# Patient Record
Sex: FEMALE | Race: BLACK OR AFRICAN AMERICAN | NOT HISPANIC OR LATINO | Employment: FULL TIME | URBAN - METROPOLITAN AREA
[De-identification: names, ages, dates, MRNs, and addresses within clinical notes are randomized per-mention and may not be internally consistent; named-entity substitution may affect disease eponyms.]

---

## 2017-12-13 PROCEDURE — 99283 EMERGENCY DEPT VISIT LOW MDM: CPT

## 2017-12-14 ENCOUNTER — HOSPITAL ENCOUNTER (EMERGENCY)
Facility: HOSPITAL | Age: 27
Discharge: HOME | End: 2017-12-14

## 2017-12-14 VITALS
HEART RATE: 90 BPM | BODY MASS INDEX: 61.68 KG/M2 | WEIGHT: 293 LBS | SYSTOLIC BLOOD PRESSURE: 183 MMHG | OXYGEN SATURATION: 99 % | RESPIRATION RATE: 18 BRPM | TEMPERATURE: 98 F | DIASTOLIC BLOOD PRESSURE: 104 MMHG

## 2017-12-14 DIAGNOSIS — Z23 NEED FOR TDAP VACCINATION: ICD-10-CM

## 2017-12-14 PROCEDURE — 90471 IMMUNIZATION ADMIN: CPT | Performed by: PHYSICIAN ASSISTANT

## 2017-12-14 PROCEDURE — 90715 TDAP VACCINE 7 YRS/> IM: CPT | Performed by: PHYSICIAN ASSISTANT

## 2017-12-14 PROCEDURE — 63600175 PHARM REV CODE 636 W HCPCS: Performed by: PHYSICIAN ASSISTANT

## 2017-12-14 RX ADMIN — CLOSTRIDIUM TETANI TOXOID ANTIGEN (FORMALDEHYDE INACTIVATED), CORYNEBACTERIUM DIPHTHERIAE TOXOID ANTIGEN (FORMALDEHYDE INACTIVATED), BORDETELLA PERTUSSIS TOXOID ANTIGEN (GLUTARALDEHYDE INACTIVATED), BORDETELLA PERTUSSIS FILAMENTOUS HEMAGGLUTININ ANTIGEN (FORMALDEHYDE INACTIVATED), BORDETELLA PERTUSSIS PERTACTIN ANTIGEN, AND BORDETELLA PERTUSSIS FIMBRIAE 2/3 ANTIGEN 0.5 ML: 5; 2; 2.5; 5; 3; 5 INJECTION, SUSPENSION INTRAMUSCULAR at 01:12
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
## 2024-11-13 ENCOUNTER — HOSPITAL ENCOUNTER (OUTPATIENT)
Dept: RADIOLOGY | Facility: HOSPITAL | Age: 34
Discharge: HOME | End: 2024-11-13
Attending: ORTHOPAEDIC SURGERY
Payer: COMMERCIAL

## 2024-11-13 ENCOUNTER — OFFICE VISIT (OUTPATIENT)
Dept: ORTHOPEDICS | Facility: CLINIC | Age: 34
End: 2024-11-13
Payer: COMMERCIAL

## 2024-11-13 DIAGNOSIS — G89.29 CHRONIC PAIN OF RIGHT THUMB: ICD-10-CM

## 2024-11-13 DIAGNOSIS — G89.29 CHRONIC PAIN OF RIGHT THUMB: Primary | ICD-10-CM

## 2024-11-13 DIAGNOSIS — M79.644 CHRONIC PAIN OF RIGHT THUMB: Primary | ICD-10-CM

## 2024-11-13 DIAGNOSIS — M79.644 CHRONIC PAIN OF RIGHT THUMB: ICD-10-CM

## 2024-11-13 DIAGNOSIS — S63.114A CLOSED DISLOCATION OF METACARPOPHALANGEAL JOINT OF RIGHT THUMB, INITIAL ENCOUNTER: Primary | ICD-10-CM

## 2024-11-13 PROCEDURE — 73140 X-RAY EXAM OF FINGER(S): CPT | Mod: 26,RT,, | Performed by: RADIOLOGY

## 2024-11-13 PROCEDURE — 99999 PR PBB SHADOW E&M-EST. PATIENT-LVL II: CPT | Mod: PBBFAC,,, | Performed by: ORTHOPAEDIC SURGERY

## 2024-11-13 PROCEDURE — 73140 X-RAY EXAM OF FINGER(S): CPT | Mod: TC,RT

## 2024-11-13 RX ORDER — DOXYCYCLINE HYCLATE 100 MG/1
TABLET, DELAYED RELEASE ORAL
COMMUNITY
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
## 2024-11-13 NOTE — ASSESSMENT & PLAN NOTE
The patient and I talked at length about the natural history and pathophysiology of her injury which is right thumb metacarpal phalangeal joint dislocation , she understands that this may lead to chronic problems which may have acute episodic exacerbations.   Symptoms may resolve, worsen and even become permanent.  The patient understands the treatment options including observation, activity modification, therapy, NSAIDs, splints and the surgical options including repair versus reconstruction of the right thumb ulnar collateral ligament.  We discussed the risks of the diagnosis and the treatment options including pain, infection, bleeding, damage to nerves and vessels, stiffness, scarring, incomplete relief or recurrence of symptoms, poor pain and functional outcomes.  Unique risks of this diagnosis and the treatment include persistent instability and arthritis.   The patient has elected to proceed with right thumb spica splinting, right thumb MRI to assess the integrity of the ulnar collateral ligament and we will follow up after the MRI.  She understands no pinching and gripping with her right-hand

## 2024-12-04 ENCOUNTER — OFFICE VISIT (OUTPATIENT)
Dept: ORTHOPEDICS | Facility: CLINIC | Age: 34
End: 2024-12-04
Payer: COMMERCIAL

## 2024-12-04 ENCOUNTER — HOSPITAL ENCOUNTER (OUTPATIENT)
Dept: RADIOLOGY | Facility: HOSPITAL | Age: 34
Discharge: HOME | End: 2024-12-04
Attending: ORTHOPAEDIC SURGERY
Payer: COMMERCIAL

## 2024-12-04 DIAGNOSIS — M79.644 CHRONIC PAIN OF RIGHT THUMB: ICD-10-CM

## 2024-12-04 DIAGNOSIS — G89.29 CHRONIC PAIN OF RIGHT THUMB: ICD-10-CM

## 2024-12-04 DIAGNOSIS — S63.114A CLOSED DISLOCATION OF METACARPOPHALANGEAL JOINT OF RIGHT THUMB, INITIAL ENCOUNTER: Primary | ICD-10-CM

## 2024-12-04 PROCEDURE — 99214 OFFICE O/P EST MOD 30 MIN: CPT | Mod: S$GLB,,, | Performed by: ORTHOPAEDIC SURGERY

## 2024-12-04 PROCEDURE — 1159F MED LIST DOCD IN RCRD: CPT | Mod: CPTII,S$GLB,, | Performed by: ORTHOPAEDIC SURGERY

## 2024-12-04 PROCEDURE — 73140 X-RAY EXAM OF FINGER(S): CPT | Mod: TC,RT

## 2024-12-04 PROCEDURE — 73140 X-RAY EXAM OF FINGER(S): CPT | Mod: 26,RT,, | Performed by: RADIOLOGY

## 2024-12-04 PROCEDURE — 99999 PR PBB SHADOW E&M-EST. PATIENT-LVL III: CPT | Mod: PBBFAC,,, | Performed by: ORTHOPAEDIC SURGERY

## 2024-12-04 RX ORDER — MELOXICAM 15 MG/1
15 TABLET ORAL DAILY
COMMUNITY
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
## 2024-12-04 NOTE — ASSESSMENT & PLAN NOTE
The patient and I talked at length about the natural history and pathophysiology of her injury which is right thumb metacarpal phalangeal joint dislocation , she understands that this may lead to chronic problems which may have acute episodic exacerbations.   Symptoms may resolve, worsen and even become permanent.  The patient understands the treatment options including observation, activity modification, therapy, NSAIDs, splints and the surgical options including repair versus reconstruction of the right thumb ulnar collateral ligament.  We discussed the risks of the diagnosis and the treatment options including pain, infection, bleeding, damage to nerves and vessels, stiffness, scarring, incomplete relief or recurrence of symptoms, poor pain and functional outcomes.  Unique risks of this diagnosis and the treatment include persistent instability and arthritis.   The patient has elected to proceed with right thumb spica splinting for another 2 weeks.  She will discontinue the splint that time we will get her into therapy and I will see her back in 4 weeks.  She understands no pinching and gripping

## 2024-12-06 ENCOUNTER — PATIENT MESSAGE (OUTPATIENT)
Dept: ORTHOPEDICS | Facility: CLINIC | Age: 34
End: 2024-12-06
Payer: COMMERCIAL

## 2024-12-09 ENCOUNTER — CLINICAL SUPPORT (OUTPATIENT)
Dept: REHABILITATION | Facility: HOSPITAL | Age: 34
End: 2024-12-09
Attending: ORTHOPAEDIC SURGERY
Payer: COMMERCIAL

## 2024-12-09 ENCOUNTER — TELEPHONE (OUTPATIENT)
Dept: ORTHOPEDICS | Facility: CLINIC | Age: 34
End: 2024-12-09
Payer: COMMERCIAL

## 2024-12-09 DIAGNOSIS — R29.898 DECREASED GRIP STRENGTH OF RIGHT HAND: ICD-10-CM

## 2024-12-09 DIAGNOSIS — Z78.9 DECREASED ACTIVITIES OF DAILY LIVING (ADL): ICD-10-CM

## 2024-12-09 DIAGNOSIS — M25.541 PAIN IN THUMB JOINT WITH MOVEMENT OF RIGHT HAND: ICD-10-CM

## 2024-12-09 DIAGNOSIS — R29.898 DECREASED PINCH STRENGTH: ICD-10-CM

## 2024-12-09 DIAGNOSIS — M25.649 STIFFNESS OF THUMB JOINT: Primary | ICD-10-CM

## 2024-12-09 DIAGNOSIS — S63.114A CLOSED DISLOCATION OF METACARPOPHALANGEAL JOINT OF RIGHT THUMB, INITIAL ENCOUNTER: ICD-10-CM

## 2024-12-09 PROCEDURE — 97165 OT EVAL LOW COMPLEX 30 MIN: CPT

## 2024-12-09 PROCEDURE — 97110 THERAPEUTIC EXERCISES: CPT

## 2024-12-09 NOTE — TELEPHONE ENCOUNTER
Spoke to patient about paperwork and going back to work. I informed her that Dr. Anders is out of the office today, but that I would check with him to make sure he is okay with her going back to work 3 days a week with going only half a day each time. Patient understood and was grateful for the call.     ----- Message from [employer] sent at 12/9/2024  8:02 AM CST -----  Contact: Frankie  Type:  Needs Medical Advice    Who Called: Frankie  Symptoms (please be specific): /   How long has patient had these symptoms:  /  Pharmacy name and phone #:  /  Would the patient rather a call back or a response via [facility]? call  Best Call Back Number: [phone]   Additional Information: needing a paperwork filled out for employer

## 2024-12-10 ENCOUNTER — PATIENT MESSAGE (OUTPATIENT)
Dept: ORTHOPEDICS | Facility: CLINIC | Age: 34
End: 2024-12-10
Payer: COMMERCIAL

## 2024-12-10 PROBLEM — R29.898 DECREASED GRIP STRENGTH OF RIGHT HAND: Status: ACTIVE | Noted: 2024-12-10

## 2024-12-10 PROBLEM — R29.898 DECREASED PINCH STRENGTH: Status: ACTIVE | Noted: 2024-12-10

## 2024-12-10 PROBLEM — M25.541 PAIN IN THUMB JOINT WITH MOVEMENT OF RIGHT HAND: Status: ACTIVE | Noted: 2024-12-10

## 2024-12-10 PROBLEM — Z78.9 DECREASED ACTIVITIES OF DAILY LIVING (ADL): Status: ACTIVE | Noted: 2024-12-10
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
## 2024-12-11 ENCOUNTER — CLINICAL SUPPORT (OUTPATIENT)
Dept: REHABILITATION | Facility: HOSPITAL | Age: 34
End: 2024-12-11
Payer: COMMERCIAL

## 2024-12-11 DIAGNOSIS — Z78.9 DECREASED ACTIVITIES OF DAILY LIVING (ADL): ICD-10-CM

## 2024-12-11 DIAGNOSIS — M25.541 PAIN IN THUMB JOINT WITH MOVEMENT OF RIGHT HAND: ICD-10-CM

## 2024-12-11 DIAGNOSIS — M25.649 STIFFNESS OF THUMB JOINT: Primary | ICD-10-CM

## 2024-12-11 DIAGNOSIS — R29.898 DECREASED PINCH STRENGTH: ICD-10-CM

## 2024-12-11 DIAGNOSIS — R29.898 DECREASED GRIP STRENGTH OF RIGHT HAND: ICD-10-CM

## 2024-12-11 PROCEDURE — 97140 MANUAL THERAPY 1/> REGIONS: CPT

## 2024-12-11 PROCEDURE — 97018 PARAFFIN BATH THERAPY: CPT

## 2024-12-11 PROCEDURE — 97112 NEUROMUSCULAR REEDUCATION: CPT

## 2024-12-11 PROCEDURE — 97110 THERAPEUTIC EXERCISES: CPT

## 2024-12-11 NOTE — PROGRESS NOTES
[facility]  Occupational Therapy Treatment Note     Date: 12/11/2024  Name: Frankie Plaza  Clinic Number: [id number]    Therapy Diagnosis:   Encounter Diagnoses   Name Primary?    Stiffness of thumb joint Yes    Pain in thumb joint with movement of right hand     Decreased activities of daily living (ADL)     Decreased  strength of right hand     Decreased pinch strength      Physician: Buck Anders MD    Physician Orders: OT eval and tx  Medical Diagnosis: Closed dislocation of metacarpophalangeal joint of right thumb, initial encounter [S63.114A]   Surgical procedure and date:  N/A  MD Order Comments: OT Eval/treat     Evaluation Date: 12/9/2024  Insurance Authorization Period Expiration: 12/4/2024 to 12/31/2024  Plan of Care Certification Period: 12/9/2024 to 2/3/2025  Progress Note Due: 1/9/2025      Visit # / Visits authorized: 1/1  FOTO: 1/3  Scores:  initial = 38% / goal = 63%                                           Date of Return to MD: 1/2/2025     Precautions:  Standard; no pinching or gripping per MD    Time In: 11:00  Time Out: 12:00  Total Billable Time: 60 minutes    Subjective     Pt reports: my thumb still hurts, but feels like it is easier to reach my thumb to my pinky.  I have been doing more repetitions than you said because it want it back sooner.  I was achy when I went to bed last night.  I took Flexaril before I came in today because I don't want the soreness like the first day.  I saw the MRI results in my chart and I could see the tear.     she was compliant with home exercise program given on evaluation.  Response to previous treatment: soreness in my thumb with starting to move it more  Functional change: still feels stiff in the larger joint of my thumb, but keep trying to bend it    Pain: 7/10 (5/10 on evaluation)  Location: thumb MP joint and thenar    Objective   Objective measures updated at progress report unless  specified.    Thumb MP pre exercise:  /32, IP /7; post exercise /20, /30  Treatment     Frankie received the treatments listed below:      therapeutic exercises to develop strength, endurance, ROM, and flexibility of right hand for 20 minutes including:  - reviewed HEP issued at evaluation  - thumb AROM with MP flex, composite ext/flex, opposition, palmar abduction - x10 reps ea  - Dexteciser - 3 min    manual therapy techniques were applied to right hand for 15 minutes including:  - use of hand techniques, cupping, IASTM tools to increase blood flow/circulation, improve soft tissue pliability and decrease pain  -gentle passive mobilization to right thumb MP joint to improve joint flexibility    neuromuscular re-education activities to improve Coordination and Proprioception of right hand for 15 minutes including:  - 9 hole peg with thumb to index, middle, ring manipulation  - small object prehension (coin p/u w/ IF,LF and marble w/ RF,SF)    therapeutic activities to improve functional performance of right hand for 0 minutes including:  -     self-care techniques to improve independence and safety with ADL/IADL tasks for 0 minutes including:  -     direct contact modalities after being cleared for contraindications for 0 minutes including:  -     supervised modalities after being cleared for contradictions for 10 minutes including:  - paraffin to right hand with MHP pre-tx to decrease pain and increase joint mobility and tissue extensibility    Home Exercises and Education Provided     Education provided:   - reviewed HEP issued at evaluation  - progress toward goals  - instructed in personal purchase of Isotoner compression glove on [employer] with resource for item    Written Home Exercises Provided: Patient instructed to cont prior HEP  Exercises were reviewed and Frankie was able to demonstrate them prior to the end of the session. Frankie demonstrated good understanding of the HEP provided.     See EMR under  Patient Instructions for exercises provided during prior visit.        Assessment     Frankie was seen for her first tx session since initial evaluation on 12/9/2024.  Patient initiated treatment program including exercises/activities to facilitate thumb mobility and functional reach.  Demonstrates slight increase in MP joint motion as compared to initial visit with decrease in guarding.  Able to participate in treatment session without increased complaints of pain.    Frankie is progressing towards her goals and there are no updates to goals at this time. Pt prognosis is Good.     Frankie will continue to benefit from skilled outpatient occupational therapy services to address the deficits listed in the problem list on initial evaluation, to provide pt/family education, and to maximize pt's level of independence in the home and community environment.     Pt's spiritual, cultural and educational needs considered and pt agreeable to plan of care and goals.    Anticipated barriers to occupational therapy: co-morbidity conditions, guarded behavior, pain     Goals:  Short Term Goals: (4 weeks)  1. Pt will be independent with HEP.  2. Pt will report decreased pain to a 4/10 with ADL/IADL tasks.   3. Pt will increase right thumb AROM by 10 degrees to enable dressing, grooming activities.  4. Pt will increase right wrist flex/ext AROM by 10 degrees to enable dressing, grooming activities.  5. Pt will make a full, flat, composite fist to enable grasping and squeezing objects for self-care.  6. Pt will report an increase in FOTO intake score of > 48%, which would indicate an improvement in quality of life.     Long Term Goals: (8 weeks)  1. Pt will report decreased pain to 1-2/10 with ADL/IADL tasks.   2. Pt will exhibit increased right thumb AROM by 20 degrees to enable independence with self-care, meal preparation and work activities.  3. Pt will exhibit increased wrist flexion/extension AROM by 20 degrees to enable  independence with self-care, meal preparation and work activities.  4. Pt will exhibit 50%  strength of right hand as compared to left hand to allow a firm grasp on cooking utensils, steering wheel, etc.  5. Pt will exhibit 9# of right functional lateral pinch strength to allow writing, opening containers, and turning keys.  6. Pt will report an increase in FOTO intake score of > 60%, which would indicate an improvement in quality of life.    Plan     Plan of Care Certification: 12/9/2024 to 2/3/2025.      Outpatient Occupational Therapy 2-3 times weekly for 8 weeks to include the following interventions PRN: Fluidotherapy, Manual Therapy, Moist Heat/ Ice, Neuromuscular Re-ed, Orthotic Management and Training, Paraffin, Patient Education, Self Care, Therapeutic Activities, Therapeutic Exercise, and Ultrasound    Updates/Grading for next session: progress occupational therapy as tolerated    Sophie Bermudez, OT

## 2024-12-16 ENCOUNTER — TELEPHONE (OUTPATIENT)
Dept: ORTHOPEDICS | Facility: CLINIC | Age: 34
End: 2024-12-16
Payer: COMMERCIAL

## 2024-12-16 NOTE — TELEPHONE ENCOUNTER
Spoke to Luba about the patient's restrictions and clarified any confusion. She understood and was grateful for the call.     ----- Message from N[place] sent at 12/16/2024  1:02 PM CST -----  Contact: Luba/ [facility]  Luba is needing a call back in regards to the patients [facility] paperwork for 12/04. Please give her a call back at [phone]

## 2024-12-17 ENCOUNTER — CLINICAL SUPPORT (OUTPATIENT)
Dept: REHABILITATION | Facility: HOSPITAL | Age: 34
End: 2024-12-17
Payer: COMMERCIAL

## 2024-12-17 DIAGNOSIS — Z78.9 DECREASED ACTIVITIES OF DAILY LIVING (ADL): ICD-10-CM

## 2024-12-17 DIAGNOSIS — M25.541 PAIN IN THUMB JOINT WITH MOVEMENT OF RIGHT HAND: ICD-10-CM

## 2024-12-17 DIAGNOSIS — R29.898 DECREASED GRIP STRENGTH OF RIGHT HAND: ICD-10-CM

## 2024-12-17 DIAGNOSIS — M25.649 STIFFNESS OF THUMB JOINT: Primary | ICD-10-CM

## 2024-12-17 DIAGNOSIS — R29.898 DECREASED PINCH STRENGTH: ICD-10-CM

## 2024-12-17 PROCEDURE — 97112 NEUROMUSCULAR REEDUCATION: CPT

## 2024-12-17 PROCEDURE — 97140 MANUAL THERAPY 1/> REGIONS: CPT

## 2024-12-17 PROCEDURE — 97530 THERAPEUTIC ACTIVITIES: CPT

## 2024-12-17 PROCEDURE — 97018 PARAFFIN BATH THERAPY: CPT

## 2024-12-17 PROCEDURE — 97110 THERAPEUTIC EXERCISES: CPT

## 2024-12-17 NOTE — PROGRESS NOTES
"  [facility]  Occupational Therapy Treatment Note     Date: 12/17/2024  Name: Frankie Plaza  Clinic Number: [id number]    Therapy Diagnosis:   Encounter Diagnoses   Name Primary?    Stiffness of thumb joint Yes    Pain in thumb joint with movement of right hand     Decreased activities of daily living (ADL)     Decreased  strength of right hand     Decreased pinch strength      Physician: Buck Anders MD    Physician Orders: OT eval and tx  Medical Diagnosis: Closed dislocation of metacarpophalangeal joint of right thumb, initial encounter [S63.114A]   Surgical procedure and date:  N/A  MD Order Comments: OT Eval/treat     Evaluation Date: 12/9/2024  Insurance Authorization Period Expiration: 12/4/2024 to 12/31/2024  Plan of Care Certification Period: 12/9/2024 to 2/3/2025  Progress Note Due: 1/9/2025      Visit # / Visits authorized: 2/8  FOTO: 1/3  Scores:  initial = 38% / goal = 63%                                           Date of Return to MD: 1/2/2025     Precautions:  Standard; no pinching or gripping per MD    Time In: 8:55  Time Out: 10:00  Total Billable Time: 60 minutes    Subjective     Pt reports: I stopped wearing the thumb brace yesterday evening and today will be the first day without.  It was painful and feels tight and stiff with and without the brace. My job still has not cleared me back to work yet.  I didn't take the Flexaril today before I came in and try to take it only if I get to that "6.".      She was compliant with home exercise program given on evaluation.  Response to previous treatment: did well after last treatment and not hurting; more flexible at the top joint, but not the back joint;   Functional change: practiced writing yesterday and able to hold the pen, but hurt a lot so modified how I hold it; easier to reach my thumb to touch my finger tips    Pain: 6.5/10 (5/10 on evaluation)  Location: thumb MP joint and " rebecca    Objective   Objective measures updated at progress report unless specified.    Thumb MP pre exercise:  /28, IP /37, ARCINIEGA=65; post exercise /40, /32, ARCINIEGA=72  Treatment     Frankie received the treatments listed below:      direct contact modalities after being cleared for contraindications for 0 minutes including:  -     supervised modalities after being cleared for contradictions for 10 minutes including:  - paraffin to right hand with MHP pre-tx to decrease pain and increase joint mobility and tissue extensibility    therapeutic exercises to develop strength, endurance, ROM, and flexibility of right hand for 8 minutes including:  - thumb AROM with MP flex, composite ext/flex, opposition, palmar abduction - x10 reps ea  - active range of motion measures of thumb pre & post     manual therapy techniques were applied to right hand for 18 minutes including:  - use of hand techniques, cupping, IASTM tools to increase blood flow/circulation, improve soft tissue pliability and decrease pain  -gentle passive mobilization/PROM to right thumb MP joint, composite thumb flexion to improve joint flexibility    neuromuscular re-education activities to improve Coordination and Proprioception of right hand for 15 minutes including:  - Dexteciser - 3 min  - 9 hole peg with thumb to middle, ring, small manipulation  - - Isospheres, CW, 3 min    therapeutic activities to improve functional performance of right hand for 8 minutes including:  - handwriting with Dr. Dudley garcia    self-care techniques to improve independence and safety with ADL/IADL tasks for 0 minutes including:  -     Home Exercises and Education Provided     Education provided:   - reviewed HEP compliance  - progress toward goals  - instructed in personal purchase of Dr. Dudley garcia on [employer] with resource for item    Written Home Exercises Provided: Patient instructed to cont prior HEP  Exercises were reviewed and Frankie was  able to demonstrate them prior to the end of the session. Frankie demonstrated good understanding of the HEP provided.     See EMR under Patient Instructions for exercises provided during prior visit.        Assessment     Frankie was seen in follow up addressing thumb mobility, pain management and functional use of right hand.  Patient discontinued thumb spica bracing yesterday per instruction of MD for 2 week wear time.  Demonstrates increase in ARCINIEGA of thumb flexion by 7 degrees pre to post manual.  Slight guarding and apprehension related to pushing into MP flexion.  Demonstrates improved tolerance to handwriting using therapist's Dr. Buckner writing instrument.  Able to participate in treatment session without increased complaints of pain.    Frankie is progressing towards her goals and there are no updates to goals at this time. Pt prognosis is Good.     Frankie will continue to benefit from skilled outpatient occupational therapy services to address the deficits listed in the problem list on initial evaluation, to provide pt/family education, and to maximize pt's level of independence in the home and community environment.     Pt's spiritual, cultural and educational needs considered and pt agreeable to plan of care and goals.    Anticipated barriers to occupational therapy: co-morbidity conditions, guarded behavior, pain     Goals:  Short Term Goals: (4 weeks)  1. Pt will be independent with HEP.  2. Pt will report decreased pain to a 4/10 with ADL/IADL tasks.   3. Pt will increase right thumb AROM by 10 degrees to enable dressing, grooming activities.  4. Pt will increase right wrist flex/ext AROM by 10 degrees to enable dressing, grooming activities.  5. Pt will make a full, flat, composite fist to enable grasping and squeezing objects for self-care.  6. Pt will report an increase in FOTO intake score of > 48%, which would indicate an improvement in quality of life.     Long Term Goals: (8 weeks)  1.  Pt will report decreased pain to 1-2/10 with ADL/IADL tasks.   2. Pt will exhibit increased right thumb AROM by 20 degrees to enable independence with self-care, meal preparation and work activities.  3. Pt will exhibit increased wrist flexion/extension AROM by 20 degrees to enable independence with self-care, meal preparation and work activities.  4. Pt will exhibit 50%  strength of right hand as compared to left hand to allow a firm grasp on cooking utensils, steering wheel, etc.  5. Pt will exhibit 9# of right functional lateral pinch strength to allow writing, opening containers, and turning keys.  6. Pt will report an increase in FOTO intake score of > 60%, which would indicate an improvement in quality of life.    Plan     Plan of Care Certification: 12/9/2024 to 2/3/2025.      Outpatient Occupational Therapy 2-3 times weekly for 8 weeks to include the following interventions PRN: Fluidotherapy, Manual Therapy, Moist Heat/ Ice, Neuromuscular Re-ed, Orthotic Management and Training, Paraffin, Patient Education, Self Care, Therapeutic Activities, Therapeutic Exercise, and Ultrasound    Updates/Grading for next session: progress occupational therapy as tolerated    Sophie Bermudez, OT

## 2024-12-18 ENCOUNTER — CLINICAL SUPPORT (OUTPATIENT)
Dept: REHABILITATION | Facility: HOSPITAL | Age: 34
End: 2024-12-18
Payer: COMMERCIAL

## 2024-12-18 DIAGNOSIS — M25.649 STIFFNESS OF THUMB JOINT: Primary | ICD-10-CM

## 2024-12-18 DIAGNOSIS — Z78.9 DECREASED ACTIVITIES OF DAILY LIVING (ADL): ICD-10-CM

## 2024-12-18 DIAGNOSIS — R29.898 DECREASED PINCH STRENGTH: ICD-10-CM

## 2024-12-18 DIAGNOSIS — R29.898 DECREASED GRIP STRENGTH OF RIGHT HAND: ICD-10-CM

## 2024-12-18 DIAGNOSIS — M25.541 PAIN IN THUMB JOINT WITH MOVEMENT OF RIGHT HAND: ICD-10-CM

## 2024-12-18 PROCEDURE — 97140 MANUAL THERAPY 1/> REGIONS: CPT

## 2024-12-18 PROCEDURE — 97112 NEUROMUSCULAR REEDUCATION: CPT

## 2024-12-18 PROCEDURE — 97018 PARAFFIN BATH THERAPY: CPT

## 2024-12-18 PROCEDURE — 97110 THERAPEUTIC EXERCISES: CPT

## 2024-12-18 NOTE — PROGRESS NOTES
[facility]  Occupational Therapy Treatment Note     Date: 12/18/2024  Name: Frankie Plaza  Clinic Number: [id number]    Therapy Diagnosis:   Encounter Diagnoses   Name Primary?    Stiffness of thumb joint Yes    Pain in thumb joint with movement of right hand     Decreased activities of daily living (ADL)     Decreased  strength of right hand     Decreased pinch strength      Physician: Buck Anders MD    Physician Orders: OT eval and tx  Medical Diagnosis: Closed dislocation of metacarpophalangeal joint of right thumb, initial encounter [S63.114A]   Surgical procedure and date:  N/A  MD Order Comments: OT Eval/treat     Evaluation Date: 12/9/2024  Insurance Authorization Period Expiration: 12/4/2024 to 12/31/2024  Plan of Care Certification Period: 12/9/2024 to 2/3/2025  Progress Note Due: 1/9/2025      Visit # / Visits authorized: 3/8  FOTO: 1/3  Scores:  initial = 38% / goal = 63%                                           Date of Return to MD: 1/2/2025     Precautions:  Standard; no pinching or gripping per MD    Time In: 10:00  Time Out: 11:00  Total Billable Time: 60 minutes    Subjective     Pt reports: I have been hurting along the side of my thumb more the past few days.  I have been cleared to return to work on Monday.  I took the Flexaril today before I came because of it hurting me more.      She was compliant with home exercise program given on evaluation.  Response to previous treatment: did okay after last treatment; still feeling more flexible at the top joint, but not the back joint  Functional change: not wearing the brace at all and trying to use my hand; ordered the Isotoner glove and the Dr.  that should deliver tomorrow    Pain: 6.5/10 (5/10 on evaluation)  Location: thumb MP joint and thenar    Objective   Objective measures updated at progress report unless specified.    Thumb MP  post exercise /40, /35, ARCINIEGA=75 (previous  72)  Treatment     Frankie received the treatments listed below:      direct contact modalities after being cleared for contraindications for 0 minutes including:  -     supervised modalities after being cleared for contradictions for 10 minutes including:  - paraffin to right hand with MHP pre-tx to decrease pain and increase joint mobility and tissue extensibility    therapeutic exercises to develop strength, endurance, ROM, and flexibility of right hand for 12 minutes including:  - thumb AROM with MP flex, composite ext/flex, opposition, palmar abduction - x10 reps ea  - active range of motion measures of thumb post exercise    manual therapy techniques were applied to right hand for 18 minutes including:  - use of hand techniques, cupping, IASTM tools to increase blood flow/circulation, improve soft tissue pliability and decrease pain  -gentle passive mobilization/PROM to right thumb MP joint, composite thumb flexion to improve joint flexibility    neuromuscular re-education activities to improve Coordination and Proprioception of right hand for 20 minutes including:  - Dexteciser - 3 min  - 9 hole peg with thumb to middle, ring, small manipulation  - in/out hand translation with marbles, 5 in hand x6  - Isospheres, CW, 3 min    therapeutic activities to improve functional performance of right hand for 0 minutes including:  - handwriting with Dr. Dudley garcia    self-care techniques to improve independence and safety with ADL/IADL tasks for 0 minutes including:  -     Home Exercises and Education Provided     Education provided:   - reviewed HEP compliance  - progress toward goals and educated related to tissue healing/recent dc of brace/functional use related to pain complaints  - reviewed personal purchase of Dr. Dudley garcia and Isotoner glove     Written Home Exercises Provided: Patient instructed to cont prior HEP  Exercises were reviewed and Frankie was able to demonstrate them  prior to the end of the session. Frankie demonstrated good understanding of the HEP provided.     See EMR under Patient Instructions for exercises provided during prior visit.        Assessment     Frankie was seen in follow up addressing thumb mobility, pain management and functional use of right hand.  Patient presents with increase in thumb pain; discussed with patient recent changes in recovery with discontinuation of thumb brace and increase in functional use.  Overall with increase in thumb ARCINIEGA as compared to initial status. Able to participate in treatment session without increased complaints of pain.    Frankie is progressing towards her goals and there are no updates to goals at this time. Pt prognosis is Good.     Frankie will continue to benefit from skilled outpatient occupational therapy services to address the deficits listed in the problem list on initial evaluation, to provide pt/family education, and to maximize pt's level of independence in the home and community environment.     Pt's spiritual, cultural and educational needs considered and pt agreeable to plan of care and goals.    Anticipated barriers to occupational therapy: co-morbidity conditions, guarded behavior, pain     Goals:  Short Term Goals: (4 weeks)  1. Pt will be independent with HEP.  2. Pt will report decreased pain to a 4/10 with ADL/IADL tasks.   3. Pt will increase right thumb AROM by 10 degrees to enable dressing, grooming activities.  4. Pt will increase right wrist flex/ext AROM by 10 degrees to enable dressing, grooming activities.  5. Pt will make a full, flat, composite fist to enable grasping and squeezing objects for self-care.  6. Pt will report an increase in FOTO intake score of > 48%, which would indicate an improvement in quality of life.     Long Term Goals: (8 weeks)  1. Pt will report decreased pain to 1-2/10 with ADL/IADL tasks.   2. Pt will exhibit increased right thumb AROM by 20 degrees to enable  independence with self-care, meal preparation and work activities.  3. Pt will exhibit increased wrist flexion/extension AROM by 20 degrees to enable independence with self-care, meal preparation and work activities.  4. Pt will exhibit 50%  strength of right hand as compared to left hand to allow a firm grasp on cooking utensils, steering wheel, etc.  5. Pt will exhibit 9# of right functional lateral pinch strength to allow writing, opening containers, and turning keys.  6. Pt will report an increase in FOTO intake score of > 60%, which would indicate an improvement in quality of life.    Plan     Plan of Care Certification: 12/9/2024 to 2/3/2025.      Outpatient Occupational Therapy 2-3 times weekly for 8 weeks to include the following interventions PRN: Fluidotherapy, Manual Therapy, Moist Heat/ Ice, Neuromuscular Re-ed, Orthotic Management and Training, Paraffin, Patient Education, Self Care, Therapeutic Activities, Therapeutic Exercise, and Ultrasound    Updates/Grading for next session: progress ROM as tolerated    Sophie Bermudez OT

## 2024-12-27 DIAGNOSIS — S63.114A CLOSED DISLOCATION OF METACARPOPHALANGEAL JOINT OF RIGHT THUMB, INITIAL ENCOUNTER: Primary | ICD-10-CM

## 2025-01-02 ENCOUNTER — OFFICE VISIT (OUTPATIENT)
Dept: ORTHOPEDICS | Facility: CLINIC | Age: 35
End: 2025-01-02
Payer: COMMERCIAL

## 2025-01-02 ENCOUNTER — HOSPITAL ENCOUNTER (OUTPATIENT)
Dept: RADIOLOGY | Facility: HOSPITAL | Age: 35
Discharge: HOME | End: 2025-01-02
Attending: ORTHOPAEDIC SURGERY
Payer: COMMERCIAL

## 2025-01-02 DIAGNOSIS — S63.114D: Primary | ICD-10-CM

## 2025-01-02 DIAGNOSIS — S63.114A CLOSED DISLOCATION OF METACARPOPHALANGEAL JOINT OF RIGHT THUMB, INITIAL ENCOUNTER: ICD-10-CM

## 2025-01-02 PROCEDURE — 99999 PR PBB SHADOW E&M-EST. PATIENT-LVL II: CPT | Mod: PBBFAC,,, | Performed by: ORTHOPAEDIC SURGERY

## 2025-01-02 PROCEDURE — 99214 OFFICE O/P EST MOD 30 MIN: CPT | Mod: S$GLB,,, | Performed by: ORTHOPAEDIC SURGERY

## 2025-01-02 PROCEDURE — 73140 X-RAY EXAM OF FINGER(S): CPT | Mod: 26,RT,, | Performed by: RADIOLOGY

## 2025-01-02 PROCEDURE — 1159F MED LIST DOCD IN RCRD: CPT | Mod: CPTII,S$GLB,, | Performed by: ORTHOPAEDIC SURGERY

## 2025-01-02 PROCEDURE — 73140 X-RAY EXAM OF FINGER(S): CPT | Mod: TC,RT

## 2025-01-02 NOTE — ASSESSMENT & PLAN NOTE
The patient and I talked at length about the natural history and pathophysiology of her injury which is right thumb metacarpal phalangeal joint dislocation , she understands that this may lead to chronic problems which may have acute episodic exacerbations.   Symptoms may resolve, worsen and even become permanent.  The patient understands the treatment options including observation, activity modification, therapy, NSAIDs, splints and the surgical options including repair versus reconstruction of the right thumb ulnar collateral ligament.  We discussed the risks of the diagnosis and the treatment options including pain, infection, bleeding, damage to nerves and vessels, stiffness, scarring, incomplete relief or recurrence of symptoms, poor pain and functional outcomes.  Unique risks of this diagnosis and the treatment include persistent instability and arthritis.   We will discontinue the splint she will continue with the therapy for range-of-motion exercises.  She understands no heavy pinch or  and I will see her back in 6 weeks

## 2025-01-02 NOTE — PROGRESS NOTES
JULIÁN Anders M.D.  Orthopaedic Hand and Wrist Surgery  [address]    Patient ID: Frankie Plaza  YOB: 1990  MRN: [id number]    Date of Injury:  November 10, 2024    Diagnosis:   Right thumb MCP dislocation    History of Present Illness: Frankie Plaza is a 34 y.o. female she is now 6 weeks out from injury her pain has improved she still has stiff at the thumb MP joint    Patient was queried and this is the extent of the patients current complaints today.    Physical Exam:   Skin:  No open wounds  Sensation:  Intact of the tip of the thumb  Motor:  Intact EPL and FPL  Swelling:  Mild  Tenderness over the thumb MP joint  MP joint range of motion from + 10 to 30° compared to the contralateral side which is from +30-70 70°  Symmetric stability of the thumb ulnar collateral ligament bilaterally        Provider Note/Medical Decision Makin. Closed dislocation of metacarpophalangeal joint of right thumb, subsequent encounter  Assessment & Plan:  The patient and I talked at length about the natural history and pathophysiology of her injury which is right thumb metacarpal phalangeal joint dislocation , she understands that this may lead to chronic problems which may have acute episodic exacerbations.   Symptoms may resolve, worsen and even become permanent.  The patient understands the treatment options including observation, activity modification, therapy, NSAIDs, splints and the surgical options including repair versus reconstruction of the right thumb ulnar collateral ligament.  We discussed the risks of the diagnosis and the treatment options including pain, infection, bleeding, damage to nerves and vessels, stiffness, scarring, incomplete relief or recurrence of symptoms, poor pain and functional outcomes.  Unique risks of this diagnosis and the treatment include persistent instability and arthritis.   We will discontinue the splint she will  continue with the therapy for range-of-motion exercises.  She understands no heavy pinch or  and I will see her back in 6 weeks               I discussed worrisome and red flag signs and symptoms with the patient. The patient expressed understanding and agreed to alert me immediately or to go to the emergency room if they experience any of these.   Treatment plan was developed with input from the patient/family, and they expressed understanding and agreement with the plan. All questions were answered today.    There are no Patient Instructions on file for this visit.    JULIÁN Anders M.D.  [facility]  Orthopedic Hand and Wrist Surgeon    [facility]  Dr. Chad Anders   [employer] Review   [employer]   [employer]     Disclaimer: This note was prepared using a voice recognition system and is likely to have sound alike errors within the text.

## 2025-01-06 ENCOUNTER — CLINICAL SUPPORT (OUTPATIENT)
Dept: REHABILITATION | Facility: HOSPITAL | Age: 35
End: 2025-01-06
Payer: COMMERCIAL

## 2025-01-06 DIAGNOSIS — M25.541 PAIN IN THUMB JOINT WITH MOVEMENT OF RIGHT HAND: ICD-10-CM

## 2025-01-06 DIAGNOSIS — M25.649 STIFFNESS OF THUMB JOINT: Primary | ICD-10-CM

## 2025-01-06 DIAGNOSIS — R29.898 DECREASED GRIP STRENGTH OF RIGHT HAND: ICD-10-CM

## 2025-01-06 DIAGNOSIS — Z78.9 DECREASED ACTIVITIES OF DAILY LIVING (ADL): ICD-10-CM

## 2025-01-06 DIAGNOSIS — R29.898 DECREASED PINCH STRENGTH: ICD-10-CM

## 2025-01-06 PROCEDURE — 97112 NEUROMUSCULAR REEDUCATION: CPT

## 2025-01-06 PROCEDURE — 97110 THERAPEUTIC EXERCISES: CPT

## 2025-01-06 PROCEDURE — 97018 PARAFFIN BATH THERAPY: CPT

## 2025-01-06 PROCEDURE — 97140 MANUAL THERAPY 1/> REGIONS: CPT

## 2025-01-06 NOTE — PROGRESS NOTES
[facility]  Occupational Therapy Treatment Note     Date: 1/6/2025  [id number]    Therapy Diagnosis:   Encounter Diagnoses   Name Primary?    Stiffness of thumb joint Yes    Pain in thumb joint with movement of right hand     Decreased activities of daily living (ADL)     Decreased  strength of right hand     Decreased pinch strength      Physician: Buck Anders MD    Physician Orders: OT eval and tx  Medical Diagnosis: Closed dislocation of metacarpophalangeal joint of right thumb, initial encounter [S63.114A]   Surgical procedure and date:  N/A  MD Order Comments: OT Eval/treat     Evaluation Date: 12/9/2024  Insurance Authorization Period Expiration: 1/1/2025 to 12/31/2025  Plan of Care Certification Period: 12/9/2024 to 2/3/2025  Progress Note Due: 1/9/2025      Visit # / Visits authorized: 1/20  FOTO: 1/3  Scores:  initial = 38% / goal = 63%                                           Date of Return to MD: 1/2/2025     Precautions:  Standard; no pinching or gripping per MD    Time In: 1:00  Time Out: 2:00  Total Billable Time: 60 minutes    Subjective     Pt reports: Saw MD on Thursday and he is concerned about the stiffness of my thumb joint.  He told me no brace or even the glove so I can move it.  He said the swelling could be around for 3 months or so.        She was compliant with home exercise program given on evaluation.  Response to previous treatment: therapy helps to stretch thumb  Functional change: able to bend my thumb easier to touch my fingers, but the larger joint is still stiff    Pain: 4/10 (6.5/10 last visit)  Location: thumb MP joint and thenar    Objective   Objective measures updated at progress report unless specified.    Thumb MP  post exercise /40, /46, ARCINIEGA=86 (previous 75)  Treatment     Frankie received the treatments listed below:      direct contact modalities after being cleared for  contraindications for 0 minutes including:  -     supervised modalities after being cleared for contradictions for 10 minutes including:  - paraffin to right hand with MHP pre-tx to decrease pain and increase joint mobility and tissue extensibility    therapeutic exercises to develop strength, endurance, ROM, and flexibility of right hand for 15 minutes including:  - thumb AROM with MP flex, composite ext/flex, opposition, palmar abduction - x10 reps ea  - active range of motion measures of thumb post exercise    manual therapy techniques were applied to right hand for 15 minutes including:  - use of hand techniques, cupping, IASTM tools to increase blood flow/circulation, improve soft tissue pliability and decrease pain  - passive mobilization/PROM to right thumb MP joint, composite thumb flexion to improve joint flexibility    neuromuscular re-education activities to improve Coordination and Proprioception of right hand for 20 minutes including:  - Dexteciser - 3 min  - in/out hand translation with marbles, 6 in hand x8  - Isospheres, CW & CCW, 4 min  - thumb to SF tip to tip prehension, 9 hole peg    therapeutic activities to improve functional performance of right hand for 0 minutes including:  -   self-care techniques to improve independence and safety with ADL/IADL tasks for 0 minutes including:  -     Home Exercises and Education Provided     Education provided:   - reviewed HEP compliance  - progress toward goals     Written Home Exercises Provided: Patient instructed to cont prior HEP  Exercises were reviewed and Frankie was able to demonstrate them prior to the end of the session. Frankie demonstrated good understanding of the HEP provided.     See EMR under Patient Instructions for exercises provided during prior visit.        Assessment     Frankie continues to progress with overall mobility and functional use of right hand.  She continues with moderate thumb MP joint stiffness.  Discussed sleeping  in Isotoner or winter glove to assist with PM and early AM discomfort.  Able to participate in treatment session without increased complaints of pain.    Frankie is progressing towards her goals and there are no updates to goals at this time. Pt prognosis is Good.     Frankie will continue to benefit from skilled outpatient occupational therapy services to address the deficits listed in the problem list on initial evaluation, to provide pt/family education, and to maximize pt's level of independence in the home and community environment.     Pt's spiritual, cultural and educational needs considered and pt agreeable to plan of care and goals.    Anticipated barriers to occupational therapy: co-morbidity conditions, guarded behavior, pain     Goals:  Short Term Goals: (4 weeks)  1. Pt will be independent with HEP.  2. Pt will report decreased pain to a 4/10 with ADL/IADL tasks.   3. Pt will increase right thumb AROM by 10 degrees to enable dressing, grooming activities.  4. Pt will increase right wrist flex/ext AROM by 10 degrees to enable dressing, grooming activities.  5. Pt will make a full, flat, composite fist to enable grasping and squeezing objects for self-care.  6. Pt will report an increase in FOTO intake score of > 48%, which would indicate an improvement in quality of life.     Long Term Goals: (8 weeks)  1. Pt will report decreased pain to 1-2/10 with ADL/IADL tasks.   2. Pt will exhibit increased right thumb AROM by 20 degrees to enable independence with self-care, meal preparation and work activities.  3. Pt will exhibit increased wrist flexion/extension AROM by 20 degrees to enable independence with self-care, meal preparation and work activities.  4. Pt will exhibit 50%  strength of right hand as compared to left hand to allow a firm grasp on cooking utensils, steering wheel, etc.  5. Pt will exhibit 9# of right functional lateral pinch strength to allow writing, opening containers, and  turning keys.  6. Pt will report an increase in FOTO intake score of > 60%, which would indicate an improvement in quality of life.    Plan     Plan of Care Certification: 12/9/2024 to 2/3/2025.      Outpatient Occupational Therapy 2-3 times weekly for 8 weeks to include the following interventions PRN: Fluidotherapy, Manual Therapy, Moist Heat/ Ice, Neuromuscular Re-ed, Orthotic Management and Training, Paraffin, Patient Education, Self Care, Therapeutic Activities, Therapeutic Exercise, and Ultrasound    Updates/Grading for next session: progress ROM as tolerated    Sophie Bermudez OT

## 2025-01-07 ENCOUNTER — CLINICAL SUPPORT (OUTPATIENT)
Dept: REHABILITATION | Facility: HOSPITAL | Age: 35
End: 2025-01-07
Payer: COMMERCIAL

## 2025-01-07 DIAGNOSIS — R29.898 DECREASED GRIP STRENGTH OF RIGHT HAND: ICD-10-CM

## 2025-01-07 DIAGNOSIS — Z78.9 DECREASED ACTIVITIES OF DAILY LIVING (ADL): ICD-10-CM

## 2025-01-07 DIAGNOSIS — M25.541 PAIN IN THUMB JOINT WITH MOVEMENT OF RIGHT HAND: ICD-10-CM

## 2025-01-07 DIAGNOSIS — R29.898 DECREASED PINCH STRENGTH: ICD-10-CM

## 2025-01-07 DIAGNOSIS — M25.649 STIFFNESS OF THUMB JOINT: Primary | ICD-10-CM

## 2025-01-07 PROCEDURE — 97018 PARAFFIN BATH THERAPY: CPT

## 2025-01-07 PROCEDURE — 97112 NEUROMUSCULAR REEDUCATION: CPT

## 2025-01-07 PROCEDURE — 97110 THERAPEUTIC EXERCISES: CPT

## 2025-01-07 PROCEDURE — 97140 MANUAL THERAPY 1/> REGIONS: CPT

## 2025-01-07 NOTE — PROGRESS NOTES
[facility]  Occupational Therapy Treatment Note and Progress Report     Date: 1/7/2025  Name: Frankie Plaza  [facility] Number: [id number]    Therapy Diagnosis:   Encounter Diagnoses   Name Primary?    Stiffness of thumb joint Yes    Pain in thumb joint with movement of right hand     Decreased activities of daily living (ADL)     Decreased  strength of right hand     Decreased pinch strength      Physician: Buck Anders MD    Physician Orders: OT eval and tx  Medical Diagnosis: Closed dislocation of metacarpophalangeal joint of right thumb, initial encounter [S63.114A]   Surgical procedure and date:  N/A  MD Order Comments: OT Eval/treat     Evaluation Date: 12/9/2024  Insurance Authorization Period Expiration: 1/1/2025 to 12/31/2025  Plan of Care Certification Period: 12/9/2024 to 2/3/2025  Progress Note Due: 1/9/2025      Visit # / Visits authorized: 2/20  FOTO: 1/3  Scores:  initial = 38% / goal = 63% / Updated status 1/7/2025 = 53%                                           Date of Return to MD: 2/13/2025   Precautions:  Standard; no pinching or gripping per MD    Time In: 1:00  Time Out: 2:00  Total Billable Time: 60 minutes    Subjective     Pt reports: tried wearing the gloves last night, but didn't seem to make much of a difference. I was still hurting when I woke up this morning.  Still working on my exercises at home.  She was compliant with home exercise program given on evaluation.  Response to previous treatment: therapy helps to stretch thumb  Functional change: I'm still dropping a lot of stuff;  I am doing more handwriting at work, but I struggle with some of it; manage my personal needs, but struggle with some activities when having to reach certain areas; still struggle to open bottles    Pain: 7/10 (4/10 last visit)  Location: thumb MP joint and thenar    Objective   Objective measures updated at progress report 1/7/2025.    Edema:  Circumferential measurements (in centimeters)    Right Right Left     12/9/2024 1/7/2025 12/9/2024   Wrist 19.0 18.0 18.9   Thumb prox phalanx 8.3    7.2 7.1   Thumb IP jt 7.2 6.7 6.7         Right Upper Extremity Active Range of Motion:      Right Right Left   ROM (in degrees) 12/9/2024 1/7/2025 12/9/2024   Wrist flexion 55 82 73   Wrist extension 41 55 65          Right Thumb Active Range of Motion:     Right Right Left   (Ext/Flex) 12/9/2024 1/7/2025 12/9/2024   MCP Jt 0/15° 0/40 0/55   IP Jt +5/35° +10/45 +5/56   Palmar Abd 35° 50 NT                        Manual Muscle Test:  deferred                                        and Pinch Strength: Not tested at this time 2* MD restrictions        Treatment     Frankie received the treatments listed below:      direct contact modalities after being cleared for contraindications for 0 minutes including:  -     supervised modalities after being cleared for contradictions for 10 minutes including:  - paraffin to right hand with MHP pre-tx to decrease pain and increase joint mobility and tissue extensibility    therapeutic exercises to develop strength, endurance, ROM, and flexibility of right hand for 15 minutes including:  - thumb AROM with MP flex, composite ext/flex, opposition, palmar abduction - x10 reps ea  - active range of motion measures of thumb post exercise    manual therapy techniques were applied to right hand for 15 minutes including:  - use of hand techniques, cupping, IASTM tools to increase blood flow/circulation, improve soft tissue pliability and decrease pain  - passive mobilization/PROM to right thumb MP joint, composite thumb flexion to improve joint flexibility    neuromuscular re-education activities to improve Coordination and Proprioception of right hand for 20 minutes including:  - - in/out hand translation with marbles, 6 in hand x8  - Isospheres, CW & CCW, 4 min  - thumb to SF tip to tip prehension, 9 hole peg    therapeutic  activities to improve functional performance of right hand for 0 minutes including:  -   self-care techniques to improve independence and safety with ADL/IADL tasks for 0 minutes including:  -     Home Exercises and Education Provided     Education provided:   - reviewed HEP compliance  - progress toward goals     Written Home Exercises Provided: Patient instructed to cont prior HEP  Exercises were reviewed and Frankie was able to demonstrate them prior to the end of the session. Frankie demonstrated good understanding of the HEP provided.     See EMR under Patient Instructions for exercises provided during prior visit.        Assessment     Frankie progressing with decreasing edema and wrist active range of motion that is WNL.  Improving mobility of thumb while with slow progression gaining MP flexion.  Able to participate in treatment session without increased complaints of pain.    Frankie is progressing towards her goals and there are no updates to goals at this time. Pt prognosis is Good.     Frankie will continue to benefit from skilled outpatient occupational therapy services to address the deficits listed in the problem list on initial evaluation, to provide pt/family education, and to maximize pt's level of independence in the home and community environment.     Pt's spiritual, cultural and educational needs considered and pt agreeable to plan of care and goals.    Anticipated barriers to occupational therapy: co-morbidity conditions, guarded behavior, pain     Goals:  Short Term Goals: (4 weeks)  1. Pt will be independent with HEP. - ONGOING GOAL  2. Pt will report decreased pain to a 4/10 with ADL/IADL tasks. - PARTIALLY MET  3. Pt will increase right thumb AROM by 10 degrees to enable dressing, grooming activities. - PARTIALLY MET  4. Pt will increase right wrist flex/ext AROM by 10 degrees to enable dressing, grooming activities. - GOAL MET  5. Pt will make a full, flat, composite fist to  enable grasping and squeezing objects for self-care. - GOAL MET  6. Pt will report an increase in FOTO intake score of > 48%, which would indicate an improvement in quality of life. - GOAL MET     Long Term Goals: (8 weeks)  1. Pt will report decreased pain to 1-2/10 with ADL/IADL tasks. - PARTIALLY MET  2. Pt will exhibit increased right thumb AROM by 20 degrees to enable independence with self-care, meal preparation and work activities. - PARTIALLY MET  3. Pt will exhibit increased wrist flexion/extension AROM by 20 degrees to enable independence with self-care, meal preparation and work activities. - GOAL MET  4. Pt will exhibit 50%  strength of right hand as compared to left hand to allow a firm grasp on cooking utensils, steering wheel, etc. - DEFERRED  5. Pt will exhibit 9# of right functional lateral pinch strength to allow writing, opening containers, and turning keys. - DEFERRED  6. Pt will report an increase in FOTO intake score of > 60%, which would indicate an improvement in quality of life. - PARTIALLY MET    Plan     Plan of Care Certification: 12/9/2024 to 2/3/2025.      Outpatient Occupational Therapy 2-3 times weekly for 8 weeks to include the following interventions PRN: Fluidotherapy, Manual Therapy, Moist Heat/ Ice, Neuromuscular Re-ed, Orthotic Management and Training, Paraffin, Patient Education, Self Care, Therapeutic Activities, Therapeutic Exercise, and Ultrasound    Updates/Grading for next session: progress ROM as tolerated; limit pinching and gripping until cleared by MD Sophie Bermudez, OT

## 2025-01-09 ENCOUNTER — CLINICAL SUPPORT (OUTPATIENT)
Dept: REHABILITATION | Facility: HOSPITAL | Age: 35
End: 2025-01-09
Payer: COMMERCIAL

## 2025-01-09 DIAGNOSIS — M25.649 STIFFNESS OF THUMB JOINT: Primary | ICD-10-CM

## 2025-01-09 DIAGNOSIS — R29.898 DECREASED GRIP STRENGTH OF RIGHT HAND: ICD-10-CM

## 2025-01-09 DIAGNOSIS — R29.898 DECREASED PINCH STRENGTH: ICD-10-CM

## 2025-01-09 DIAGNOSIS — Z78.9 DECREASED ACTIVITIES OF DAILY LIVING (ADL): ICD-10-CM

## 2025-01-09 DIAGNOSIS — M25.541 PAIN IN THUMB JOINT WITH MOVEMENT OF RIGHT HAND: ICD-10-CM

## 2025-01-09 PROCEDURE — 97112 NEUROMUSCULAR REEDUCATION: CPT

## 2025-01-09 PROCEDURE — 97018 PARAFFIN BATH THERAPY: CPT

## 2025-01-09 PROCEDURE — 97140 MANUAL THERAPY 1/> REGIONS: CPT

## 2025-01-09 PROCEDURE — 97110 THERAPEUTIC EXERCISES: CPT

## 2025-01-09 NOTE — PROGRESS NOTES
[facility]  Occupational Therapy Treatment Note      Date: 1/9/2025  Name: Frankie Plaza  Clinic Number: [id number]    Therapy Diagnosis:   Encounter Diagnoses   Name Primary?    Stiffness of thumb joint Yes    Pain in thumb joint with movement of right hand     Decreased activities of daily living (ADL)     Decreased  strength of right hand     Decreased pinch strength      Physician: Buck Anders MD    Physician Orders: OT eval and tx  Medical Diagnosis: Closed dislocation of metacarpophalangeal joint of right thumb, initial encounter [S63.114A]   Surgical procedure and date:  N/A  MD Order Comments: OT Eval/treat     Evaluation Date: 12/9/2024  Insurance Authorization Period Expiration: 1/1/2025 to 12/31/2025  Plan of Care Certification Period: 12/9/2024 to 2/3/2025  Progress Note Due: 1/9/2025      Visit # / Visits authorized: 3 / 20  FOTO: 2/3  Scores:  initial = 38% / goal = 63% / Updated status 1/7/2025 = 53%                                           Date of Return to MD: 2/13/2025   Precautions:  Standard; no pinching or gripping per MD    Time In: 10:30  Time Out: 11:30  Total Billable Time: 60 minutes    Subjective     Pt reports: I was a little sore after the last visit.  Feel like I need something to support my thumb and it would feel better.  The tape you put on helps to lessen the pain along the side of my thumb and it doesn't stop me from moving.  She was compliant with home exercise program given on evaluation.  Response to previous treatment: therapy helps to stretch thumb  Functional change: my thumb is not feeling as stiff and not hurting like it was when I am resting my hand;  really bothers me with handwriting, but using the Dr.  pen helps some.  I write for about 30 minutes and then take a break.      Pain: 7/10 (4/10 last visit)  Location: thumb MP joint and thenar    Objective   Objective measures updated at progress report unless  specified.    Post manual: Thumb MP /40, IP /40->47    Treatment     Frankie received the treatments listed below:      direct contact modalities after being cleared for contraindications for 0 minutes including:  -     supervised modalities after being cleared for contradictions for 10 minutes including:  - paraffin to right hand with MHP pre-tx to decrease pain and increase joint mobility and tissue extensibility    therapeutic exercises to develop strength, endurance, ROM, and flexibility of right hand for 15 minutes including:  - thumb AROM with MP flex, composite ext/flex, opposition, palmar abduction - x10 reps ea  - active range of motion measures of thumb post exercise    manual therapy techniques were applied to right hand for 15 minutes including:  - use of hand techniques, cupping, IASTM tools to increase blood flow/circulation, improve soft tissue pliability and decrease pain  - passive mobilization/PROM to right thumb MP joint, composite thumb flexion to improve joint flexibility    neuromuscular re-education activities to improve Coordination and Proprioception of right hand for 20 minutes including:  - in/out hand translation with marbles, 6 in hand x8  - Isospheres, CW & CCW, 4 min  - thumb to SF tip to tip prehension, 9 hole peg and sm/flat objects x50+    therapeutic activities to improve functional performance of right hand for 0 minutes including:  -   self-care techniques to improve independence and safety with ADL/IADL tasks for 0 minutes including:  -     Home Exercises and Education Provided     Education provided:   - reviewed HEP compliance  - progress toward goals     Written Home Exercises Provided: Patient instructed to cont prior HEP  Exercises were reviewed and Frankie was able to demonstrate them prior to the end of the session. Frankie demonstrated good understanding of the HEP provided.     See EMR under Patient Instructions for exercises provided during prior visit.     "    Assessment     Frankie progressing with overall thumb mobility while has a challenging time to relax and move vs stressing other muscle groups to pull into flexion.  Initiated application of 1 1/2" strip K-tape circumferentially around thumb MP joint that appears to provide support without inhibiting motion per observation and patient reporting.  Able to participate in treatment session without increased complaints of pain.    Frankie is progressing towards her goals and there are no updates to goals at this time. Pt prognosis is Good.     Frankie will continue to benefit from skilled outpatient occupational therapy services to address the deficits listed in the problem list on initial evaluation, to provide pt/family education, and to maximize pt's level of independence in the home and community environment.     Pt's spiritual, cultural and educational needs considered and pt agreeable to plan of care and goals.    Anticipated barriers to occupational therapy: co-morbidity conditions, guarded behavior, pain     Goals:  Short Term Goals: (4 weeks)  1. Pt will be independent with HEP.   2. Pt will report decreased pain to a 4/10 with ADL/IADL tasks.   3. Pt will increase right thumb AROM by 10 degrees to enable dressing, grooming activities.   4. Pt will increase right wrist flex/ext AROM by 10 degrees to enable dressing, grooming activities. - GOAL MET  5. Pt will make a full, flat, composite fist to enable grasping and squeezing objects for self-care. - GOAL MET  6. Pt will report an increase in FOTO intake score of > 48%, which would indicate an improvement in quality of life. - GOAL MET     Long Term Goals: (8 weeks)  1. Pt will report decreased pain to 1-2/10 with ADL/IADL tasks.   2. Pt will exhibit increased right thumb AROM by 20 degrees to enable independence with self-care, meal preparation and work activities.   3. Pt will exhibit increased wrist flexion/extension AROM by 20 degrees to enable " independence with self-care, meal preparation and work activities. - GOAL MET  4. Pt will exhibit 50%  strength of right hand as compared to left hand to allow a firm grasp on cooking utensils, steering wheel, etc.   5. Pt will exhibit 9# of right functional lateral pinch strength to allow writing, opening containers, and turning keys.   6. Pt will report an increase in FOTO intake score of > 60%, which would indicate an improvement in quality of life.     Plan     Plan of Care Certification: 12/9/2024 to 2/3/2025.      Outpatient Occupational Therapy 2-3 times weekly for 8 weeks to include the following interventions PRN: Fluidotherapy, Manual Therapy, Moist Heat/ Ice, Neuromuscular Re-ed, Orthotic Management and Training, Paraffin, Patient Education, Self Care, Therapeutic Activities, Therapeutic Exercise, and Ultrasound    Updates/Grading for next session: progress ROM as tolerated; limit pinching and gripping until cleared by MD Sophie Bermudez, OT

## 2025-01-13 ENCOUNTER — CLINICAL SUPPORT (OUTPATIENT)
Dept: REHABILITATION | Facility: HOSPITAL | Age: 35
End: 2025-01-13
Payer: COMMERCIAL

## 2025-01-13 DIAGNOSIS — M25.541 PAIN IN THUMB JOINT WITH MOVEMENT OF RIGHT HAND: ICD-10-CM

## 2025-01-13 DIAGNOSIS — Z78.9 DECREASED ACTIVITIES OF DAILY LIVING (ADL): ICD-10-CM

## 2025-01-13 DIAGNOSIS — R29.898 DECREASED GRIP STRENGTH OF RIGHT HAND: ICD-10-CM

## 2025-01-13 DIAGNOSIS — M25.649 STIFFNESS OF THUMB JOINT: Primary | ICD-10-CM

## 2025-01-13 DIAGNOSIS — R29.898 DECREASED PINCH STRENGTH: ICD-10-CM

## 2025-01-13 PROCEDURE — 97140 MANUAL THERAPY 1/> REGIONS: CPT

## 2025-01-13 PROCEDURE — 97110 THERAPEUTIC EXERCISES: CPT

## 2025-01-13 PROCEDURE — 97018 PARAFFIN BATH THERAPY: CPT

## 2025-01-13 PROCEDURE — 97112 NEUROMUSCULAR REEDUCATION: CPT

## 2025-01-13 NOTE — PROGRESS NOTES
[facility]  Occupational Therapy Treatment Note      Date: 1/13/2025  Name: Frankie Plaza  Clinic Number: [id number]    Therapy Diagnosis:   Encounter Diagnoses   Name Primary?    Stiffness of thumb joint Yes    Pain in thumb joint with movement of right hand     Decreased activities of daily living (ADL)     Decreased  strength of right hand     Decreased pinch strength      Physician: Buck Anders MD    Physician Orders: OT eval and tx  Medical Diagnosis: Closed dislocation of metacarpophalangeal joint of right thumb, initial encounter [S63.114A]   Surgical procedure and date:  N/A  MD Order Comments: OT Eval/treat     Evaluation Date: 12/9/2024  Insurance Authorization Period Expiration: 1/1/2025 to 12/31/2025  Plan of Care Certification Period: 12/9/2024 to 2/3/2025  Progress Note Due: 2/4/2025      Visit # / Visits authorized: 4 / 20  FOTO: 2/3  Scores:  initial = 38% / goal = 63% / Updated status 1/7/2025 = 53%                                           Date of Return to MD: 2/13/2025   Precautions:  Standard; no pinching or gripping per MD    Time In: 12:50  Time Out: 1:55  Total Billable Time: 60 minutes    Subjective     Pt reports: I started hurting more along the side of my thumb that started about 3 days ago.  The tape you put on helps to lessen the pain along the side of my thumb and it doesn't stop me from moving.  I used a warm towel to help with the pain.  She was compliant with home exercise program given on evaluation.  Response to previous treatment: therapy helps to stretch thumb  Functional change: my thumb stiffness is less than what it was, but that joint is bothering me; I'm using it as much as I can and just push through it     Pain: 8.5/10 (7/10 last visit)  Location: thumb MP joint and thenar    Objective   Objective measures updated at progress report unless specified.    Treatment     Frankie received the treatments listed below:       direct contact modalities after being cleared for contraindications for 0 minutes including:  -     supervised modalities after being cleared for contradictions for 10 minutes including:  - paraffin to right hand with MHP pre-tx to decrease pain and increase joint mobility and tissue extensibility    therapeutic exercises to develop strength, endurance, ROM, and flexibility of right hand for 15 minutes including:  - thumb AROM with MP flex, composite ext/flex, opposition, palmar abduction - x10 reps ea  - - composite gripping w/ pink foam, 3x10    manual therapy techniques were applied to right hand for 15 minutes including:  - use of hand techniques, cupping, IASTM tools to increase blood flow/circulation, improve soft tissue pliability and decrease pain  - passive mobilization/PROM to right thumb MP joint, composite thumb flexion to improve joint flexibility    neuromuscular re-education activities to improve Coordination and Proprioception of right hand for 20 minutes including:  - in/out hand translation with marbles, 6 in hand x8  - Isospheres, CW & CCW, 4 min  - thumb to SF tip to tip prehension, 9 hole peg and sm/flat objects x50+    therapeutic activities to improve functional performance of right hand for 0 minutes including:  -   self-care techniques to improve independence and safety with ADL/IADL tasks for 0 minutes including:  -     Home Exercises and Education Provided     Education provided:   - reviewed HEP compliance  - progress toward goals     Written Home Exercises Provided: Patient instructed to cont prior HEP  Exercises were reviewed and Frankie was able to demonstrate them prior to the end of the session. Frankie demonstrated good understanding of the HEP provided.     See EMR under Patient Instructions for exercises provided during prior visit.        Assessment     Frankie progressing overall with mobility and function while pain complaints continue to present and elevated with  today's visit.  Added additional support with K-tape at lateral thumb MP joint that provides patient relief.  Able to participate in treatment session without increased complaints of pain.    Frankie is progressing towards her goals and there are no updates to goals at this time. Pt prognosis is Good.     Frankie will continue to benefit from skilled outpatient occupational therapy services to address the deficits listed in the problem list on initial evaluation, to provide pt/family education, and to maximize pt's level of independence in the home and community environment.     Pt's spiritual, cultural and educational needs considered and pt agreeable to plan of care and goals.    Anticipated barriers to occupational therapy: co-morbidity conditions, guarded behavior, pain     Goals:  Short Term Goals: (4 weeks)  1. Pt will be independent with HEP.   2. Pt will report decreased pain to a 4/10 with ADL/IADL tasks.   3. Pt will increase right thumb AROM by 10 degrees to enable dressing, grooming activities.   4. Pt will increase right wrist flex/ext AROM by 10 degrees to enable dressing, grooming activities. - GOAL MET  5. Pt will make a full, flat, composite fist to enable grasping and squeezing objects for self-care. - GOAL MET  6. Pt will report an increase in FOTO intake score of > 48%, which would indicate an improvement in quality of life. - GOAL MET     Long Term Goals: (8 weeks)  1. Pt will report decreased pain to 1-2/10 with ADL/IADL tasks.   2. Pt will exhibit increased right thumb AROM by 20 degrees to enable independence with self-care, meal preparation and work activities.   3. Pt will exhibit increased wrist flexion/extension AROM by 20 degrees to enable independence with self-care, meal preparation and work activities. - GOAL MET  4. Pt will exhibit 50%  strength of right hand as compared to left hand to allow a firm grasp on cooking utensils, steering wheel, etc.   5. Pt will exhibit 9# of  right functional lateral pinch strength to allow writing, opening containers, and turning keys.   6. Pt will report an increase in FOTO intake score of > 60%, which would indicate an improvement in quality of life.     Plan     Plan of Care Certification: 12/9/2024 to 2/3/2025.      Outpatient Occupational Therapy 2-3 times weekly for 8 weeks to include the following interventions PRN: Fluidotherapy, Manual Therapy, Moist Heat/ Ice, Neuromuscular Re-ed, Orthotic Management and Training, Paraffin, Patient Education, Self Care, Therapeutic Activities, Therapeutic Exercise, and Ultrasound    Updates/Grading for next session: progress ROM as tolerated; limit pinching and gripping until cleared by MD Sophie Bermudez, OT

## 2025-01-23 NOTE — PROGRESS NOTES
[facility]  Occupational Therapy Treatment Note      Date: 1/24/2025  Name: Frankie Plaza  Clinic Number: [id number]    Therapy Diagnosis:   Encounter Diagnoses   Name Primary?    Stiffness of thumb joint Yes    Pain in thumb joint with movement of right hand     Decreased activities of daily living (ADL)     Decreased  strength of right hand     Decreased pinch strength        Physician: Buck Anders MD    Physician Orders: OT eval and tx  Medical Diagnosis: Closed dislocation of metacarpophalangeal joint of right thumb, initial encounter [S63.114A]   Surgical procedure and date:  N/A  MD Order Comments: OT Eval/treat     Evaluation Date: 12/9/2024  Insurance Authorization Period Expiration: 1/1/2025 to 12/31/2025  Plan of Care Certification Period: 12/9/2024 to 2/3/2025  Progress Note Due: 2/4/2025      Visit # / Visits authorized: 5 / 20  FOTO: 2/3  Scores:  initial = 38% / goal = 63% / Updated status 1/7/2025 = 53%                                           Date of Return to MD: 2/13/2025   Precautions:  Standard; no pinching or gripping per MD    Time In: 9:30  Time Out: 10:30  Total Billable Time: 60 minutes    Subjective     Pt reports: Feels like my hand is going to pop in half..it didn't like the cold.  I am wearing my Isotoner and a winter glove at night to help keep it warm. I am doing okay when moving around and doing stuff.    She was compliant with home exercise program given on evaluation.  Response to previous treatment: therapy helps to stretch thumb and feels looser after I finish  Functional change: my handwriting is still sloppy using the Dr. , but not able to use a normal pain; still notice dropping things when I try holding different things    Pain: 5/10 (8.5/10 last visit)  Location: thumb MP joint and thenar    Objective   Objective measures updated at progress report unless specified.     Post manual: MP flexion /40 (left 56), IP  flexion /52 (left 55)  Treatment     Frankie received the treatments listed below:      direct contact modalities after being cleared for contraindications for 0 minutes including:  -     supervised modalities after being cleared for contradictions for 10 minutes including:  - paraffin to right hand with MHP pre-tx to decrease pain and increase joint mobility and tissue extensibility    therapeutic exercises to develop strength, endurance, ROM, and flexibility of right hand for 20 minutes including:  - thumb AROM with MP flex, composite ext/flex, opposition, palmar abduction - x10 reps ea  - active range of motion measures of thumb post exercise  - paraffin ball gripping/kneading, 3 min  - composite gripping and thumb flexion w/ pink foam, 3x10 ea    manual therapy techniques were applied to right hand for 15 minutes including:  - use of hand techniques, cupping, IASTM tools to increase blood flow/circulation, improve soft tissue pliability and decrease pain  - passive mobilization/PROM to right thumb MP joint, composite thumb flexion to improve joint flexibility    neuromuscular re-education activities to improve Coordination and Proprioception of right hand for 15 minutes including:  - in/out hand translation with sm/flat objects , 6 in hand x10  - Isospheres, CW & CCW, 4 min  -   therapeutic activities to improve functional performance of right hand for 0 minutes including:  -   self-care techniques to improve independence and safety with ADL/IADL tasks for 0 minutes including:  -     Home Exercises and Education Provided     Education provided:   - reviewed HEP compliance  - progress toward goals     Written Home Exercises Provided: Patient instructed to cont prior HEP  Exercises were reviewed and Frankie was able to demonstrate them prior to the end of the session. Frankie demonstrated good understanding of the HEP provided.     See EMR under Patient Instructions for exercises provided during prior visit.         Assessment     Frankie continues to present with moderate joint tightness of thumb MP joint while improved from initial status, but slow progression.  Continues to have a 16 degree deficit as compared to left hand.  Demonstrates improving mobility to manipulate small objects in/out of hand. Able to participate in treatment session without increased complaints of pain.    Frankie is progressing towards her goals and there are no updates to goals at this time. Pt prognosis is Good.     Frankie will continue to benefit from skilled outpatient occupational therapy services to address the deficits listed in the problem list on initial evaluation, to provide pt/family education, and to maximize pt's level of independence in the home and community environment.     Pt's spiritual, cultural and educational needs considered and pt agreeable to plan of care and goals.    Anticipated barriers to occupational therapy: co-morbidity conditions, guarded behavior, pain     Goals:  Short Term Goals: (4 weeks)  1. Pt will be independent with HEP.   2. Pt will report decreased pain to a 4/10 with ADL/IADL tasks.   3. Pt will increase right thumb AROM by 10 degrees to enable dressing, grooming activities.   4. Pt will increase right wrist flex/ext AROM by 10 degrees to enable dressing, grooming activities. - GOAL MET  5. Pt will make a full, flat, composite fist to enable grasping and squeezing objects for self-care. - GOAL MET  6. Pt will report an increase in FOTO intake score of > 48%, which would indicate an improvement in quality of life. - GOAL MET     Long Term Goals: (8 weeks)  1. Pt will report decreased pain to 1-2/10 with ADL/IADL tasks.   2. Pt will exhibit increased right thumb AROM by 20 degrees to enable independence with self-care, meal preparation and work activities.   3. Pt will exhibit increased wrist flexion/extension AROM by 20 degrees to enable independence with self-care, meal preparation and work  activities. - GOAL MET  4. Pt will exhibit 50%  strength of right hand as compared to left hand to allow a firm grasp on cooking utensils, steering wheel, etc.   5. Pt will exhibit 9# of right functional lateral pinch strength to allow writing, opening containers, and turning keys.   6. Pt will report an increase in FOTO intake score of > 60%, which would indicate an improvement in quality of life.     Plan     Plan of Care Certification: 12/9/2024 to 2/3/2025.      Outpatient Occupational Therapy 2-3 times weekly for 8 weeks to include the following interventions PRN: Fluidotherapy, Manual Therapy, Moist Heat/ Ice, Neuromuscular Re-ed, Orthotic Management and Training, Paraffin, Patient Education, Self Care, Therapeutic Activities, Therapeutic Exercise, and Ultrasound    Updates/Grading for next session: progress ROM as tolerated; limit pinching and gripping until cleared by MD Sophie Bermudez, OT

## 2025-01-24 ENCOUNTER — CLINICAL SUPPORT (OUTPATIENT)
Dept: REHABILITATION | Facility: HOSPITAL | Age: 35
End: 2025-01-24
Payer: COMMERCIAL

## 2025-01-24 DIAGNOSIS — Z78.9 DECREASED ACTIVITIES OF DAILY LIVING (ADL): ICD-10-CM

## 2025-01-24 DIAGNOSIS — M25.541 PAIN IN THUMB JOINT WITH MOVEMENT OF RIGHT HAND: ICD-10-CM

## 2025-01-24 DIAGNOSIS — R29.898 DECREASED PINCH STRENGTH: ICD-10-CM

## 2025-01-24 DIAGNOSIS — R29.898 DECREASED GRIP STRENGTH OF RIGHT HAND: ICD-10-CM

## 2025-01-24 DIAGNOSIS — M25.649 STIFFNESS OF THUMB JOINT: Primary | ICD-10-CM

## 2025-01-24 PROCEDURE — 97140 MANUAL THERAPY 1/> REGIONS: CPT

## 2025-01-24 PROCEDURE — 97112 NEUROMUSCULAR REEDUCATION: CPT

## 2025-01-24 PROCEDURE — 97110 THERAPEUTIC EXERCISES: CPT

## 2025-01-24 PROCEDURE — 97018 PARAFFIN BATH THERAPY: CPT

## 2025-01-27 ENCOUNTER — CLINICAL SUPPORT (OUTPATIENT)
Dept: REHABILITATION | Facility: HOSPITAL | Age: 35
End: 2025-01-27
Payer: COMMERCIAL

## 2025-01-27 ENCOUNTER — TELEPHONE (OUTPATIENT)
Dept: ORTHOPEDICS | Facility: CLINIC | Age: 35
End: 2025-01-27
Payer: COMMERCIAL

## 2025-01-27 ENCOUNTER — PATIENT MESSAGE (OUTPATIENT)
Dept: ORTHOPEDICS | Facility: CLINIC | Age: 35
End: 2025-01-27
Payer: COMMERCIAL

## 2025-01-27 DIAGNOSIS — R29.898 DECREASED PINCH STRENGTH: ICD-10-CM

## 2025-01-27 DIAGNOSIS — M25.541 PAIN IN THUMB JOINT WITH MOVEMENT OF RIGHT HAND: ICD-10-CM

## 2025-01-27 DIAGNOSIS — Z78.9 DECREASED ACTIVITIES OF DAILY LIVING (ADL): ICD-10-CM

## 2025-01-27 DIAGNOSIS — M25.649 STIFFNESS OF THUMB JOINT: Primary | ICD-10-CM

## 2025-01-27 DIAGNOSIS — R29.898 DECREASED GRIP STRENGTH OF RIGHT HAND: ICD-10-CM

## 2025-01-27 PROCEDURE — 97112 NEUROMUSCULAR REEDUCATION: CPT

## 2025-01-27 PROCEDURE — 97018 PARAFFIN BATH THERAPY: CPT

## 2025-01-27 PROCEDURE — 97140 MANUAL THERAPY 1/> REGIONS: CPT

## 2025-01-27 PROCEDURE — 97110 THERAPEUTIC EXERCISES: CPT

## 2025-01-27 NOTE — PROGRESS NOTES
[facility]  Occupational Therapy Treatment Note      Date: 1/27/2025  Name: Frankie Plaza  Clinic Number: [id number]    Therapy Diagnosis:   Encounter Diagnoses   Name Primary?    Stiffness of thumb joint Yes    Pain in thumb joint with movement of right hand     Decreased activities of daily living (ADL)     Decreased  strength of right hand     Decreased pinch strength        Physician: Buck Anders MD    Physician Orders: OT eval and tx  Medical Diagnosis: Closed dislocation of metacarpophalangeal joint of right thumb, initial encounter [S63.114A]   Surgical procedure and date:  N/A  MD Order Comments: OT Eval/treat     Evaluation Date: 12/9/2024  Insurance Authorization Period Expiration: 1/1/2025 to 12/31/2025  Plan of Care Certification Period: 12/9/2024 to 2/3/2025  Progress Note Due: 2/4/2025      Visit # / Visits authorized: 6 / 20  FOTO: 2/3  Scores:  initial = 38% / goal = 63% / Updated status 1/7/2025 = 53%                                           Date of Return to MD: 2/13/2025   Precautions:  Standard; no pinching or gripping per MD    Time In: 13:00  Time Out: 14:00  Total Billable Time: 60 minutes    Subjective     Pt reports: Feels like that joint is stiff like cardboard and it doesn't want to bend.  The smaller joint is bending pretty good.  It definitely feels tight and stiff first thing in the morning when I get up and it takes a bit to even start to loosen up.     She was compliant with home exercise program given on evaluation.  Response to previous treatment: therapy helps to stretch thumb and feels looser after I finish  Functional change: keep trying to use my hand and thumb, but still not doing a lot of pinching or gripping    Pain: 5/10 (8.5/10 last visit)  Location: thumb MP joint and thenar    Objective   Objective measures updated at progress report unless specified.     Post manual: MP flexion /42 (left 56), IP flexion /52 (left  55)  Treatment     Frankie received the treatments listed below:      direct contact modalities after being cleared for contraindications for 0 minutes including:  -     supervised modalities after being cleared for contradictions for 10 minutes including:  - paraffin to right hand with MHP pre-tx to decrease pain and increase joint mobility and tissue extensibility    therapeutic exercises to develop strength, endurance, ROM, and flexibility of right hand for 23 minutes including:  - thumb AROM with MP flex, composite ext/flex, opposition, palmar abduction - x10 reps ea  - active range of motion measures of thumb post exercise  - paraffin ball gripping/kneading, 4 min  - composite gripping and thumb flexion w/ pink foam, 3x10 ea  - wrist over wedge,3 positions w/ 1#, 3x10 ea    manual therapy techniques were applied to right hand for 15 minutes including:  - use of hand techniques, cupping, IASTM tools to increase blood flow/circulation, improve soft tissue pliability and decrease pain  - passive mobilization/PROM to right thumb MP joint, composite thumb flexion to improve joint flexibility    neuromuscular re-education activities to improve Coordination and Proprioception of right hand for 12 minutes including:  - in/out hand translation with sm/flat objects , 6 in hand x10  - Isospheres, CW & CCW, 4 min  -   therapeutic activities to improve functional performance of right hand for 0 minutes including:  -   self-care techniques to improve independence and safety with ADL/IADL tasks for 0 minutes including:  -     Home Exercises and Education Provided     Education provided:   - reviewed HEP compliance  - progress toward goals     Written Home Exercises Provided: Patient instructed to cont prior HEP  Exercises were reviewed and Frankie was able to demonstrate them prior to the end of the session. Frankie demonstrated good understanding of the HEP provided.     See EMR under Patient Instructions for exercises  provided during prior visit.        Assessment     Frankie continues to present with moderate joint tightness of thumb MP joint that is progressing slowly.  Thumb IP flexion is WNL.  Addressing light gripping in right hand and minimal resisted thumb flexion presses.  Demonstrates improving mobility to manipulate small objects in/out of hand. Able to participate in treatment session without increased complaints of pain.    Frankie is progressing towards her goals and there are no updates to goals at this time. Pt prognosis is Good.     Frankie will continue to benefit from skilled outpatient occupational therapy services to address the deficits listed in the problem list on initial evaluation, to provide pt/family education, and to maximize pt's level of independence in the home and community environment.     Pt's spiritual, cultural and educational needs considered and pt agreeable to plan of care and goals.    Anticipated barriers to occupational therapy: co-morbidity conditions, guarded behavior, pain     Goals:  Short Term Goals: (4 weeks)  1. Pt will be independent with HEP.   2. Pt will report decreased pain to a 4/10 with ADL/IADL tasks.   3. Pt will increase right thumb AROM by 10 degrees to enable dressing, grooming activities.   4. Pt will increase right wrist flex/ext AROM by 10 degrees to enable dressing, grooming activities. - GOAL MET  5. Pt will make a full, flat, composite fist to enable grasping and squeezing objects for self-care. - GOAL MET  6. Pt will report an increase in FOTO intake score of > 48%, which would indicate an improvement in quality of life. - GOAL MET     Long Term Goals: (8 weeks)  1. Pt will report decreased pain to 1-2/10 with ADL/IADL tasks.   2. Pt will exhibit increased right thumb AROM by 20 degrees to enable independence with self-care, meal preparation and work activities.   3. Pt will exhibit increased wrist flexion/extension AROM by 20 degrees to enable independence  with self-care, meal preparation and work activities. - GOAL MET  4. Pt will exhibit 50%  strength of right hand as compared to left hand to allow a firm grasp on cooking utensils, steering wheel, etc.   5. Pt will exhibit 9# of right functional lateral pinch strength to allow writing, opening containers, and turning keys.   6. Pt will report an increase in FOTO intake score of > 60%, which would indicate an improvement in quality of life.     Plan     Plan of Care Certification: 12/9/2024 to 2/3/2025.      Outpatient Occupational Therapy 2-3 times weekly for 8 weeks to include the following interventions PRN: Fluidotherapy, Manual Therapy, Moist Heat/ Ice, Neuromuscular Re-ed, Orthotic Management and Training, Paraffin, Patient Education, Self Care, Therapeutic Activities, Therapeutic Exercise, and Ultrasound    Updates/Grading for next session: progress ROM as tolerated; re-assess to update/extend POC     Sophie Bermudez, OT

## 2025-01-27 NOTE — TELEPHONE ENCOUNTER
Spoke with patient, patient will keep her appointment. Patient is requesting fit for duty to be filled out patient will be sending paperwork over for completion. Patient states since the weather has been colder she has been in more pain. I offered patient the soonest appt we have on 2/4 patient declined at this time and states she will let me know if it got worse where she needed to come in sooner but at this time she will keep her follow up appointment on 2/13.     ----- Message from Nyla sent at 1/27/2025  8:07 AM CST -----  Contact: Frankie  Type:  Sooner Apoointment Request    Caller is requesting a sooner appointment. Caller will not accept being placed on the waitlist and is requesting a message be sent to doctor.  Name of Caller:Frankie  When is the first available appointment?scheduled 2/13/25  Symptoms:6 week follow-up right thumb  Would the patient rather a call back or a response via [facility]? call  Best Call Back Number:[phone]  Additional Information: Patient request to be seen for a sooner appointment. Patient request to speak with staff member Ms. Palacio.   Thank you,  GH

## 2025-01-31 ENCOUNTER — CLINICAL SUPPORT (OUTPATIENT)
Dept: REHABILITATION | Facility: HOSPITAL | Age: 35
End: 2025-01-31
Payer: COMMERCIAL

## 2025-01-31 ENCOUNTER — PATIENT MESSAGE (OUTPATIENT)
Dept: ORTHOPEDICS | Facility: CLINIC | Age: 35
End: 2025-01-31
Payer: COMMERCIAL

## 2025-01-31 DIAGNOSIS — M25.541 PAIN IN THUMB JOINT WITH MOVEMENT OF RIGHT HAND: ICD-10-CM

## 2025-01-31 DIAGNOSIS — R29.898 DECREASED GRIP STRENGTH OF RIGHT HAND: ICD-10-CM

## 2025-01-31 DIAGNOSIS — R29.898 DECREASED PINCH STRENGTH: ICD-10-CM

## 2025-01-31 DIAGNOSIS — M25.649 STIFFNESS OF THUMB JOINT: Primary | ICD-10-CM

## 2025-01-31 DIAGNOSIS — Z78.9 DECREASED ACTIVITIES OF DAILY LIVING (ADL): ICD-10-CM

## 2025-01-31 PROCEDURE — 97018 PARAFFIN BATH THERAPY: CPT

## 2025-01-31 PROCEDURE — 97140 MANUAL THERAPY 1/> REGIONS: CPT

## 2025-01-31 PROCEDURE — 97112 NEUROMUSCULAR REEDUCATION: CPT

## 2025-01-31 PROCEDURE — 97110 THERAPEUTIC EXERCISES: CPT

## 2025-01-31 NOTE — PROGRESS NOTES
[facility]  Occupational Therapy Treatment Note and Updated Plan of Care     Date: 1/31/2025  Name: Frankie Plaza  Clinic Number: [id number]    Therapy Diagnosis:   Encounter Diagnoses   Name Primary?    Stiffness of thumb joint Yes    Pain in thumb joint with movement of right hand     Decreased activities of daily living (ADL)     Decreased  strength of right hand     Decreased pinch strength        Physician: Buck Anders MD    Physician Orders: OT eval and tx  Medical Diagnosis: Closed dislocation of metacarpophalangeal joint of right thumb, initial encounter [S63.114A]   Surgical procedure and date:  N/A  MD Order Comments: OT Eval/treat     Evaluation Date: 12/9/2024  Insurance Authorization Period Expiration: 1/1/2025 to 12/31/2025  Plan of Care Certification Period: 12/9/2024 to 2/3/2025  Updated Plan of Care Certification Period: 1/31/2025 to 3/14/2025  Progress Note Due: 30 days (~2/28/2025)      Visit # / Visits authorized: 7 / 20  FOTO: 3/3  Scores:  initial = 38% / goal = 63% / Status 1/7/2025 = 53% / Updated status 1/31/2025 = 60%                                           Date of Return to MD: 2/13/2025   Precautions:  Standard; no pinching or gripping per MD    Time In: 10:30  Time Out: 11:30  Total Billable Time: 60 minutes    Subjective     Pt reports: I'm better, but not where I would like to be.  I still feeling achy, but not painful.  With the flexibility I feel better, but not where I want to be.  It still feels stuck at the back joint and like it needs to pop.  It takes about 2 hours every morning to loosen it up.        She was compliant with home exercise program given on evaluation.  Response to previous treatment: therapy helps to stretch thumb and feels looser after I finish  Functional change: keep trying to use my hand and thumb; hard to open bottle tops    Pain: 6/10 (5/10 last visit)  Location: thumb MP joint and  thenar    Objective   Objective measures updated at progress report 1/31/2025.     Edema: Circumferential measurements (in centimeters)    Right Right Right Left     12/9/2024 1/7/2025 1/31/2025 12/9/2024   Wrist 19.0 18.0 17.9 18.9   Thumb prox phalanx 8.3    7.2 7.3 7.1   Thumb IP jt 7.2 6.7 6.7 6.7         Right Upper Extremity Active Range of Motion:      Right Right Right Left   ROM (in degrees) 12/9/2024 1/7/2025 1/31/2025 12/9/2024   Wrist flexion 55 82 83 73   Wrist extension 41 55 56 65          Right Thumb Active Range of Motion:     Right Right Right Left   (Ext/Flex) 12/9/2024 1/7/2025 1/31/2025 12/9/2024   MCP Jt 0/15° 0/40 0/40 0/55   IP Jt +5/35° +10/45 +10/47 +5/56   Palmar Abd 35° 50 60 62                                                              and Pinch Strength (in pounds, psi's): initial measurements recorded 1/31/2025   Right Left    1/31/2025 1/31/2025    II 25 78   Lateral deferred 19.5   Tripod deferred 22      Treatment     Frankie received the treatments listed below:      direct contact modalities after being cleared for contraindications for 0 minutes including:  -     supervised modalities after being cleared for contradictions for 10 minutes including:  - paraffin to right hand with MHP pre-tx to decrease pain and increase joint mobility and tissue extensibility    therapeutic exercises to develop strength, endurance, ROM, and flexibility of right hand for 25 minutes including:  - thumb AROM with MP flex, composite ext/flex, opposition, palmar abduction - x10 reps ea  - girth, ROM, /pinch strength, FOTO w/ review of status and discussion for updated POC  STRENGTHENING:  - paraffin ball gripping/kneading, 4 min  - composite gripping and thumb flexion w/ blue foam, 3x10 ea  - wrist over wedge,3 positions w/ 1#, 3x10 ea    manual therapy techniques were applied to right hand for 15 minutes including:  - use of hand techniques, cupping, IASTM tools to increase blood  flow/circulation, improve soft tissue pliability and decrease pain  - passive mobilization/PROM to right thumb MP joint, composite thumb flexion to improve joint flexibility    neuromuscular re-education activities to improve Coordination and Proprioception of right hand for 10 minutes including:  - in/out hand translation with sm/flat objects , 6 in hand x10  - Isospheres, CW & CCW, 4 min  -   therapeutic activities to improve functional performance of right hand for 0 minutes including:  -   self-care techniques to improve independence and safety with ADL/IADL tasks for 0 minutes including:  -     Home Exercises and Education Provided     Education provided:   - reviewed HEP compliance  - progress toward goals     Written Home Exercises Provided: Patient instructed to cont prior HEP  Exercises were reviewed and Frankie was able to demonstrate them prior to the end of the session. Frankie demonstrated good understanding of the HEP provided.     See EMR under Patient Instructions for exercises provided during prior visit.        Assessment     Frankie has been seen for a total of 11 visits, including initial evaluation, and demonstrates decreasing thumb edema with improving wrist/thumb mobility and functional use.  Her pain complaints fluctuate, but overall are reducing.  She continues to present with moderate joint tightness of thumb MP joint that is progressing slowly.  Thumb IP flexion is WNL.  Initial  strength measurements reflect a moderate deficit as compared to left hand.  No right hand pinch strength were recorded to take.  Able to participate in treatment session without increased complaints of pain.    Frankie is progressing towards her goals and there are no updates to goals at this time. Pt prognosis is Good.     Frankie will continue to benefit from skilled outpatient occupational therapy services to address the deficits listed in the problem list on initial evaluation, to provide  pt/family education, and to maximize pt's level of independence in the home and community environment.     Pt's spiritual, cultural and educational needs considered and pt agreeable to plan of care and goals.    Anticipated barriers to occupational therapy: co-morbidity conditions, guarded behavior, pain     Goals:  Short Term Goals: (4 weeks)  1. Pt will be independent with HEP. - ONGOING GOAL  2. Pt will report decreased pain to a 4/10 with ADL/IADL tasks. - PARTIALLY MET  3. Pt will increase right thumb AROM by 10 degrees to enable dressing, grooming activities. - GOAL MET 1/31/2025  4. Pt will increase right wrist flex/ext AROM by 10 degrees to enable dressing, grooming activities. - GOAL MET  5. Pt will make a full, flat, composite fist to enable grasping and squeezing objects for self-care. - GOAL MET  6. Pt will report an increase in FOTO intake score of > 48%, which would indicate an improvement in quality of life. - GOAL MET  7. New goal 1/31/2025 - Pt will increase thumb MP joint flexion by 10 degrees to improve functional prehension and pinch for daily activities     Long Term Goals: (8 weeks)  1. Pt will report decreased pain to 1-2/10 with ADL/IADL tasks. - PARTIALLY MET  2. Pt will exhibit increased right thumb AROM by 20 degrees to enable independence with self-care, meal preparation and work activities. - PARTIALLY MET  3. Pt will exhibit increased wrist flexion/extension AROM by 20 degrees to enable independence with self-care, meal preparation and work activities. - GOAL MET  4. Pt will exhibit 50%  strength of right hand as compared to left hand to allow a firm grasp on cooking utensils, steering wheel, etc. - PARTIALLY MET  5. Pt will exhibit 9# of right functional lateral pinch strength to allow writing, opening containers, and turning keys. - DEFERRED  6. Pt will report an increase in FOTO intake score of > 60%, which would indicate an improvement in quality of life. - PARTIALLY MET    Plan      Plan of Care Certification: 12/9/2024 to 2/3/2025.  Updated Plan of Care Certification:  1/31/2025 to 3/14/2025     Outpatient Occupational Therapy 2-3 times weekly for 8 weeks to include the following interventions PRN: Fluidotherapy, Manual Therapy, Moist Heat/ Ice, Neuromuscular Re-ed, Orthotic Management and Training, Paraffin, Patient Education, Self Care, Therapeutic Activities, Therapeutic Exercise, and Ultrasound    Updates/Grading for next session: progress ROM as tolerated; thumb MP joint mobilizations for flexion range     Sophie Bermudez, OT

## 2025-02-03 ENCOUNTER — CLINICAL SUPPORT (OUTPATIENT)
Dept: REHABILITATION | Facility: HOSPITAL | Age: 35
End: 2025-02-03
Payer: COMMERCIAL

## 2025-02-03 DIAGNOSIS — R29.898 DECREASED GRIP STRENGTH OF RIGHT HAND: ICD-10-CM

## 2025-02-03 DIAGNOSIS — Z78.9 DECREASED ACTIVITIES OF DAILY LIVING (ADL): ICD-10-CM

## 2025-02-03 DIAGNOSIS — M25.649 STIFFNESS OF THUMB JOINT: Primary | ICD-10-CM

## 2025-02-03 DIAGNOSIS — M25.541 PAIN IN THUMB JOINT WITH MOVEMENT OF RIGHT HAND: ICD-10-CM

## 2025-02-03 DIAGNOSIS — R29.898 DECREASED PINCH STRENGTH: ICD-10-CM

## 2025-02-03 PROCEDURE — 97140 MANUAL THERAPY 1/> REGIONS: CPT

## 2025-02-03 PROCEDURE — 97112 NEUROMUSCULAR REEDUCATION: CPT

## 2025-02-03 PROCEDURE — 97110 THERAPEUTIC EXERCISES: CPT

## 2025-02-03 PROCEDURE — 97035 APP MDLTY 1+ULTRASOUND EA 15: CPT

## 2025-02-03 PROCEDURE — 97018 PARAFFIN BATH THERAPY: CPT

## 2025-02-03 NOTE — PROGRESS NOTES
[facility]  Occupational Therapy Treatment Note      Date: 2/3/2025  Name: Frankie Plaza  Clinic Number: [id number]    Therapy Diagnosis:   Encounter Diagnoses   Name Primary?    Stiffness of thumb joint Yes    Pain in thumb joint with movement of right hand     Decreased activities of daily living (ADL)     Decreased  strength of right hand     Decreased pinch strength        Physician: Buck Anders MD    Physician Orders: OT eval and tx  Medical Diagnosis: Closed dislocation of metacarpophalangeal joint of right thumb, initial encounter [S63.114A]   Surgical procedure and date:  N/A  MD Order Comments: OT Eval/treat     Evaluation Date: 12/9/2024  Insurance Authorization Period Expiration: 1/1/2025 to 12/31/2025  Plan of Care Certification Period: 12/9/2024 to 2/3/2025  Updated Plan of Care Certification Period: 1/31/2025 to 3/14/2025  Progress Note Due: 30 days (~2/28/2025)      Visit # / Visits authorized: 8 / 20  FOTO: 3/3  Scores:  initial = 38% / goal = 63% / Status 1/7/2025 = 53% / Updated status 1/31/2025 = 60%                                           Date of Return to MD: 2/13/2025   Precautions:  Standard; no pinching or gripping per MD    Time In: 13:00  Time Out: 14:00  Total Billable Time: 60 minutes    Subjective     Pt reports: I have been in more pain over the last 3 days that is radiating around my thumb. I had to use the Voltaren cream to help and took a muscle relaxer today.  I didn't do anything unusual this weekend because I was studying throughout the weekend.  No real housework activities.   The pain feels like it was after the accident.    She was compliant with home exercise program given on evaluation.  Response to previous treatment: therapy helps to stretch thumb and feels looser after I finish  Functional change: keep trying to use my hand and thumb; hard to open bottle tops    Pain: 8/10 (6/10 last visit)  Location: thumb MP  joint and thenar    Objective   Objective measures updated at progress report 1/31/2025.                                   Treatment     Frankie received the treatments listed below:      direct contact modalities after being cleared for contraindications for 8 minutes including:  - ultrasound to right thumb MP joint (circumferential), 3.3 MHz, 50% duty cycle, 0.8 w/cm2 for 8 minutes to increase circulation and tissue extensibility and decrease pain    supervised modalities after being cleared for contradictions for 10 minutes including:  - paraffin to right hand with MHP pre-tx to decrease pain and increase joint mobility and tissue extensibility    therapeutic exercises to develop strength, endurance, ROM, and flexibility of right hand for 17 minutes including:  - thumb AROM with MP flex, composite ext/flex, opposition, palmar abduction - x10 reps ea  - STRENGTHENING:  - paraffin ball gripping/kneading, 4 min  - composite gripping and thumb flexion w/ blue foam, 3x10 ea  - wrist over wedge,3 positions w/ 1#, 3x10 ea    manual therapy techniques were applied to right hand for 15 minutes including:  - use of hand techniques, cupping, IASTM tools to increase blood flow/circulation, improve soft tissue pliability and decrease pain  - passive mobilization/PROM to right thumb MP joint, composite thumb flexion to improve joint flexibility    neuromuscular re-education activities to improve Coordination and Proprioception of right hand for 10 minutes including:  - in/out hand translation with sm/flat objects , 6 in hand x10  - Isospheres, CW & CCW, 4 min  -   therapeutic activities to improve functional performance of right hand for 0 minutes including:  -   self-care techniques to improve independence and safety with ADL/IADL tasks for 0 minutes including:  -     Home Exercises and Education Provided     Education provided:   - reviewed HEP compliance  - progress toward goals     Written Home Exercises Provided:  Patient instructed to cont prior HEP  Exercises were reviewed and Frankie was able to demonstrate them prior to the end of the session. Frankie demonstrated good understanding of the HEP provided.     See EMR under Patient Instructions for exercises provided during prior visit.        Assessment     Frankie presents with an increase in her pain complaints that started 3 days ago and not associated with an event or change in weather.  Discussed the possibility of increase in pain could be associated with her increasing duration of handwriting at work and with studying activities.  Added ultrasound to treatment plan to assist with tissue healing and pain control isolated to MP joint area.  Able to participate in treatment session without increased complaints of pain.    Frankie is progressing towards her goals and there are no updates to goals at this time. Pt prognosis is Good.     Frankie will continue to benefit from skilled outpatient occupational therapy services to address the deficits listed in the problem list on initial evaluation, to provide pt/family education, and to maximize pt's level of independence in the home and community environment.     Pt's spiritual, cultural and educational needs considered and pt agreeable to plan of care and goals.    Anticipated barriers to occupational therapy: co-morbidity conditions, guarded behavior, pain     Goals:  Short Term Goals: (4 weeks)  1. Pt will be independent with HEP.   2. Pt will report decreased pain to a 4/10 with ADL/IADL tasks.   3. Pt will increase right thumb AROM by 10 degrees to enable dressing, grooming activities. - GOAL MET 1/31/2025  4. Pt will increase right wrist flex/ext AROM by 10 degrees to enable dressing, grooming activities. - GOAL MET  5. Pt will make a full, flat, composite fist to enable grasping and squeezing objects for self-care. - GOAL MET  6. Pt will report an increase in FOTO intake score of > 48%, which would indicate  an improvement in quality of life. - GOAL MET  7. New goal 1/31/2025 - Pt will increase thumb MP joint flexion by 10 degrees to improve functional prehension and pinch for daily activities     Long Term Goals: (8 weeks)  1. Pt will report decreased pain to 1-2/10 with ADL/IADL tasks.   2. Pt will exhibit increased right thumb AROM by 20 degrees to enable independence with self-care, meal preparation and work activities.   3. Pt will exhibit increased wrist flexion/extension AROM by 20 degrees to enable independence with self-care, meal preparation and work activities. - GOAL MET  4. Pt will exhibit 50%  strength of right hand as compared to left hand to allow a firm grasp on cooking utensils, steering wheel, etc.   5. Pt will exhibit 9# of right functional lateral pinch strength to allow writing, opening containers, and turning keys.   6. Pt will report an increase in FOTO intake score of > 60%, which would indicate an improvement in quality of life.     Plan     Plan of Care Certification: 12/9/2024 to 2/3/2025.  Updated Plan of Care Certification:  1/31/2025 to 3/14/2025     Outpatient Occupational Therapy 2-3 times weekly for 8 weeks to include the following interventions PRN: Fluidotherapy, Manual Therapy, Moist Heat/ Ice, Neuromuscular Re-ed, Orthotic Management and Training, Paraffin, Patient Education, Self Care, Therapeutic Activities, Therapeutic Exercise, and Ultrasound    Updates/Grading for next session: progress ROM as tolerated; thumb MP joint mobilizations for flexion range     Sophie Bermudez OT

## 2025-02-05 ENCOUNTER — CLINICAL SUPPORT (OUTPATIENT)
Dept: REHABILITATION | Facility: HOSPITAL | Age: 35
End: 2025-02-05
Payer: COMMERCIAL

## 2025-02-05 DIAGNOSIS — M25.541 PAIN IN THUMB JOINT WITH MOVEMENT OF RIGHT HAND: ICD-10-CM

## 2025-02-05 DIAGNOSIS — M25.649 STIFFNESS OF THUMB JOINT: Primary | ICD-10-CM

## 2025-02-05 DIAGNOSIS — R29.898 DECREASED PINCH STRENGTH: ICD-10-CM

## 2025-02-05 DIAGNOSIS — R29.898 DECREASED GRIP STRENGTH OF RIGHT HAND: ICD-10-CM

## 2025-02-05 DIAGNOSIS — Z78.9 DECREASED ACTIVITIES OF DAILY LIVING (ADL): ICD-10-CM

## 2025-02-05 PROCEDURE — 97035 APP MDLTY 1+ULTRASOUND EA 15: CPT

## 2025-02-05 PROCEDURE — 97140 MANUAL THERAPY 1/> REGIONS: CPT

## 2025-02-05 PROCEDURE — 97018 PARAFFIN BATH THERAPY: CPT

## 2025-02-05 PROCEDURE — 97110 THERAPEUTIC EXERCISES: CPT

## 2025-02-05 PROCEDURE — 97112 NEUROMUSCULAR REEDUCATION: CPT

## 2025-02-05 NOTE — PROGRESS NOTES
[facility]  Occupational Therapy Treatment Note      Date: 2/5/2025  Name: Frankie Plaza  Clinic Number: [id number]    Therapy Diagnosis:   Encounter Diagnoses   Name Primary?    Stiffness of thumb joint Yes    Pain in thumb joint with movement of right hand     Decreased activities of daily living (ADL)     Decreased  strength of right hand     Decreased pinch strength        Physician: Buck Anders MD    Physician Orders: OT eval and tx  Medical Diagnosis: Closed dislocation of metacarpophalangeal joint of right thumb, initial encounter [S63.114A]   Surgical procedure and date:  N/A  MD Order Comments: OT Eval/treat     Evaluation Date: 12/9/2024  Insurance Authorization Period Expiration: 1/1/2025 to 12/31/2025  Plan of Care Certification Period: 12/9/2024 to 2/3/2025  Updated Plan of Care Certification Period: 1/31/2025 to 3/14/2025  Progress Note Due: 30 days (~2/28/2025)      Visit # / Visits authorized: 9 / 20  FOTO: 3/3  Scores:  initial = 38% / goal = 63% / Status 1/7/2025 = 53% / Updated status 1/31/2025 = 60%                                           Date of Return to MD: 2/13/2025   Precautions:  Standard; no pinching or gripping per MD    Time In: 14:30  Time Out: 15:33  Total Billable Time: 63 minutes    Subjective     Pt reports: My thumb is not hurting as bad as it was this past weekend, but it is still feeling stiff.  I took some pain medication before coming to therapy today.    She was compliant with home exercise program given on evaluation.  Response to previous treatment: therapy helps to stretch thumb and feels looser after I finish  Functional change: able to do handwriting with Dr. Dudley mann, but it is sore after; keep trying to use my hand     Pain: 6/10 (810 last visit)  Location: thumb MP joint and thenar    Objective   Objective measures updated at progress report 1/31/2025.                                   Treatment     Frankie  received the treatments listed below:      direct contact modalities after being cleared for contraindications for 8 minutes including:  - ultrasound to right thumb MP joint (circumferential), 3.3 MHz, 50% duty cycle, 0.8 w/cm2 for 8 minutes to increase circulation and tissue extensibility and decrease pain    supervised modalities after being cleared for contradictions for 10 minutes including:  - paraffin to right hand with MHP pre-tx to decrease pain and increase joint mobility and tissue extensibility    therapeutic exercises to develop strength, endurance, ROM, and flexibility of right hand for 20 minutes including:  - thumb AROM with MP flex, composite ext/flex, opposition, palmar abduction - x10 reps ea  - STRENGTHENING:  - paraffin ball gripping/kneading, 4 min  - composite gripping and thumb flexion w/ blue foam, 3x10 ea  - wrist over wedge,3 positions w/ 1#, 3x10 ea  - 3 pt pinch w/ Velcro board, remove w/ IF,MF and replace w/ RF,SF x30    manual therapy techniques were applied to right hand for 15 minutes including:  - use of hand techniques, cupping, IASTM tools to increase blood flow/circulation, improve soft tissue pliability and decrease pain  - passive mobilization/PROM to right thumb MP joint, composite thumb flexion to improve joint flexibility    neuromuscular re-education activities to improve Coordination and Proprioception of right hand for 10 minutes including:  - in/out hand translation with sm/flat objects , 6 in hand x10  - Isospheres, CW & CCW, 4 min  -   therapeutic activities to improve functional performance of right hand for 0 minutes including:      self-care techniques to improve independence and safety with ADL/IADL tasks for 0 minutes including:  -     Home Exercises and Education Provided     Education provided:   - reviewed HEP compliance  - progress toward goals     Written Home Exercises Provided: Patient instructed to cont prior HEP  Exercises were reviewed and Frankie was  able to demonstrate them prior to the end of the session. Frankie demonstrated good understanding of the HEP provided.     See EMR under Patient Instructions for exercises provided during prior visit.        Assessment     Frankie presents today with decreased complaints of pain intensity as reported at previous visit.  Progressed thumb strengthening with light resistive 3 point pinch which patient completes with noted effort and mild fatigue at completion.  Continue ultrasound to treatment plan to assist with tissue healing and pain control isolated to MP joint area. Slight decrease in MP joint tightness post ASTYM.  Patient able to participate in treatment session without increased complaints of pain.    Frankie is progressing towards her goals and there are no updates to goals at this time. Pt prognosis is Good.     Frankie will continue to benefit from skilled outpatient occupational therapy services to address the deficits listed in the problem list on initial evaluation, to provide pt/family education, and to maximize pt's level of independence in the home and community environment.     Pt's spiritual, cultural and educational needs considered and pt agreeable to plan of care and goals.    Anticipated barriers to occupational therapy: co-morbidity conditions, guarded behavior, pain     Goals:  Short Term Goals: (4 weeks)  1. Pt will be independent with HEP.   2. Pt will report decreased pain to a 4/10 with ADL/IADL tasks.   3. Pt will increase right thumb AROM by 10 degrees to enable dressing, grooming activities. - GOAL MET 1/31/2025  4. Pt will increase right wrist flex/ext AROM by 10 degrees to enable dressing, grooming activities. - GOAL MET  5. Pt will make a full, flat, composite fist to enable grasping and squeezing objects for self-care. - GOAL MET  6. Pt will report an increase in FOTO intake score of > 48%, which would indicate an improvement in quality of life. - GOAL MET  7. New goal  1/31/2025 - Pt will increase thumb MP joint flexion by 10 degrees to improve functional prehension and pinch for daily activities     Long Term Goals: (8 weeks)  1. Pt will report decreased pain to 1-2/10 with ADL/IADL tasks.   2. Pt will exhibit increased right thumb AROM by 20 degrees to enable independence with self-care, meal preparation and work activities.   3. Pt will exhibit increased wrist flexion/extension AROM by 20 degrees to enable independence with self-care, meal preparation and work activities. - GOAL MET  4. Pt will exhibit 50%  strength of right hand as compared to left hand to allow a firm grasp on cooking utensils, steering wheel, etc.   5. Pt will exhibit 9# of right functional lateral pinch strength to allow writing, opening containers, and turning keys.   6. Pt will report an increase in FOTO intake score of > 60%, which would indicate an improvement in quality of life.     Plan     Plan of Care Certification: 12/9/2024 to 2/3/2025.  Updated Plan of Care Certification:  1/31/2025 to 3/14/2025     Outpatient Occupational Therapy 2-3 times weekly for 8 weeks to include the following interventions PRN: Fluidotherapy, Manual Therapy, Moist Heat/ Ice, Neuromuscular Re-ed, Orthotic Management and Training, Paraffin, Patient Education, Self Care, Therapeutic Activities, Therapeutic Exercise, and Ultrasound    Updates/Grading for next session: progress ROM as tolerated; thumb MP joint mobilizations for flexion range     Sophie Bermudez, OT

## 2025-02-12 DIAGNOSIS — S63.114D: Primary | ICD-10-CM

## 2025-02-13 ENCOUNTER — CLINICAL SUPPORT (OUTPATIENT)
Dept: REHABILITATION | Facility: HOSPITAL | Age: 35
End: 2025-02-13
Payer: COMMERCIAL

## 2025-02-13 ENCOUNTER — OFFICE VISIT (OUTPATIENT)
Dept: ORTHOPEDICS | Facility: CLINIC | Age: 35
End: 2025-02-13
Payer: COMMERCIAL

## 2025-02-13 ENCOUNTER — HOSPITAL ENCOUNTER (OUTPATIENT)
Dept: RADIOLOGY | Facility: HOSPITAL | Age: 35
Discharge: HOME | End: 2025-02-13
Attending: ORTHOPAEDIC SURGERY
Payer: COMMERCIAL

## 2025-02-13 DIAGNOSIS — S63.114D: Primary | ICD-10-CM

## 2025-02-13 DIAGNOSIS — R29.898 DECREASED GRIP STRENGTH OF RIGHT HAND: ICD-10-CM

## 2025-02-13 DIAGNOSIS — M25.649 STIFFNESS OF THUMB JOINT: Primary | ICD-10-CM

## 2025-02-13 DIAGNOSIS — M25.541 PAIN IN THUMB JOINT WITH MOVEMENT OF RIGHT HAND: ICD-10-CM

## 2025-02-13 DIAGNOSIS — Z78.9 DECREASED ACTIVITIES OF DAILY LIVING (ADL): ICD-10-CM

## 2025-02-13 DIAGNOSIS — R29.898 DECREASED PINCH STRENGTH: ICD-10-CM

## 2025-02-13 DIAGNOSIS — S63.114D: ICD-10-CM

## 2025-02-13 PROCEDURE — 73140 X-RAY EXAM OF FINGER(S): CPT | Mod: 26,RT,, | Performed by: RADIOLOGY

## 2025-02-13 PROCEDURE — 97110 THERAPEUTIC EXERCISES: CPT

## 2025-02-13 PROCEDURE — 73140 X-RAY EXAM OF FINGER(S): CPT | Mod: TC,RT

## 2025-02-13 PROCEDURE — 99999 PR PBB SHADOW E&M-EST. PATIENT-LVL II: CPT | Mod: PBBFAC,,, | Performed by: ORTHOPAEDIC SURGERY

## 2025-02-13 PROCEDURE — 97018 PARAFFIN BATH THERAPY: CPT

## 2025-02-13 PROCEDURE — 97140 MANUAL THERAPY 1/> REGIONS: CPT

## 2025-02-13 PROCEDURE — 97035 APP MDLTY 1+ULTRASOUND EA 15: CPT

## 2025-02-13 PROCEDURE — 99214 OFFICE O/P EST MOD 30 MIN: CPT | Mod: S$GLB,,, | Performed by: ORTHOPAEDIC SURGERY

## 2025-02-13 PROCEDURE — 1159F MED LIST DOCD IN RCRD: CPT | Mod: CPTII,S$GLB,, | Performed by: ORTHOPAEDIC SURGERY

## 2025-02-13 NOTE — PROGRESS NOTES
JULIÁN Anders M.D.  Orthopaedic Hand and Wrist Surgery  [facility]    Patient ID: Frankie Plaza  YOB: 1990  MRN: [id number]    Date of Injury:  November 10, 2024     Diagnosis:   Right thumb MCP dislocation    History of Present Illness: Frankie Plaza is a 34 y.o. female who is now 3 months out from right thumb metacarpal phalangeal joint dislocation she reports her pain is much better in her function is improving but she is still very stiff at the metacarpal phalangeal joint.  She understands that this may be permanent but she also we would like to try to work with therapy a little bit longer to see if we can get her range of motion to improve.    Patient was queried and this is the extent of the patients current complaints today.    Physical Exam:   Skin:  No open wounds  Sensation:  No decreased sensation of the thumb  Motor:  Intact EPL FPL  Swelling:  Minimal  Thumb IP joint range of motion is from + 10-70  Thumb MP joint range of motion is from +10-10   No instability of the thumb MP joint  Kapandji score is 7    Imaging:  Right thumb x-rays show a located MP joint with post traumatic changes.    Provider Note/Medical Decision Makin. Closed dislocation of metacarpophalangeal joint of right thumb, subsequent encounter  Assessment & Plan:  The patient and I talked at length about the natural history and pathophysiology of her injury which is right thumb metacarpal phalangeal joint dislocation , she understands that this may lead to chronic problems which may have acute episodic exacerbations.   Symptoms may resolve, worsen and even become permanent.  The patient understands the treatment options including observation, activity modification, therapy, NSAIDs, splints and the surgical options including repair versus reconstruction of the right thumb ulnar collateral ligament.  We discussed the risks of the diagnosis and the treatment  options including pain, infection, bleeding, damage to nerves and vessels, stiffness, scarring, incomplete relief or recurrence of symptoms, poor pain and functional outcomes.  Unique risks of this diagnosis and the treatment include persistent instability and arthritis.   She will continue working with therapy at this point.  She would like to try to get a little bit more motion out of the metacarpal phalangeal joint.  We discussed the possibility of tenolysis and capsulectomy and the risks associated with this including instability of the thumb.  We will see her back in 2 months               I discussed worrisome and red flag signs and symptoms with the patient. The patient expressed understanding and agreed to alert me immediately or to go to the emergency room if they experience any of these.   Treatment plan was developed with input from the patient/family, and they expressed understanding and agreement with the plan. All questions were answered today.    There are no Patient Instructions on file for this visit.    JULIÁN Anders M.D.  [facility]  Orthopedic Hand and Wrist Surgeon    [facility]  Dr. Chad Anders   [employer]   [employer]   [employer]     Disclaimer: This note was prepared using a voice recognition system and is likely to have sound alike errors within the text.

## 2025-02-13 NOTE — PROGRESS NOTES
[facility]  Occupational Therapy Treatment Note      Date: 2/13/2025  Name: Frankie Plaza  Clinic Number: [id number]    Therapy Diagnosis:   Encounter Diagnoses   Name Primary?    Stiffness of thumb joint Yes    Pain in thumb joint with movement of right hand     Decreased activities of daily living (ADL)     Decreased  strength of right hand     Decreased pinch strength        Physician: Buck Anders MD    Physician Orders: OT eval and tx  Medical Diagnosis: Closed dislocation of metacarpophalangeal joint of right thumb, initial encounter [S63.114A]   Surgical procedure and date:  N/A  MD Order Comments: OT Eval/treat     Evaluation Date: 12/9/2024  Insurance Authorization Period Expiration: 1/1/2025 to 12/31/2025  Plan of Care Certification Period: 12/9/2024 to 2/3/2025  Updated Plan of Care Certification Period: 1/31/2025 to 3/14/2025  Progress Note Due: 30 days (~2/28/2025)      Visit # / Visits authorized: 10 / 20  FOTO: 3/3  Scores:  initial = 38% / goal = 63% / Status 1/7/2025 = 53% / Updated status 1/31/2025 = 60%                                           Date of Return to MD: 4/17/2025   Precautions:  Standard    Time In: 13:00  Time Out: 14:00  Total Billable Time: 60 minutes    Subjective     Pt reports: I saw Dr. Anders today and he did another x-ray.  He gave me a choice to have surgery to remove scar tissue or continue with therapy to work the scar tissue.  He thought the surgery could cause me to loose some of the stability of my joint.  He wants me to come back in mid April.  I didn't take any muscle relaxers today.      She was compliant with home exercise program given on evaluation.  Response to previous treatment: therapy still helps to loosen my thumb up  Functional change: I have been writing really well with the Dr. Buckner pen, but not with a regular pen     Pain: 4/10 (4/10 last visit)  Location: thumb MP joint and thenar    Objective    Objective measures updated at progress report 1/31/2025.                                   Treatment     Frankie received the treatments listed below:      direct contact modalities after being cleared for contraindications for 8 minutes including:  - ultrasound to right thumb MP joint (circumferential), 3.3 MHz, 50% duty cycle, 0.8 w/cm2  to increase circulation and tissue extensibility and decrease pain    supervised modalities after being cleared for contradictions for 10 minutes including:  - paraffin to right hand with MHP pre-tx to decrease pain and increase joint mobility and tissue extensibility    therapeutic exercises to develop strength, endurance, ROM, and flexibility of right hand for 22 minutes including:  - thumb AROM with MP flex, composite ext/flex, opposition, palmar abduction - x10 reps ea  - STRENGTHENING:  - paraffin ball gripping/kneading, 5 min  - composite gripping w/ hand gripper, #2, 3x10   - wrist over wedge,3 positions w/ 2#, 3x10 ea  - 3 pt pinch & lateral pinch, 2# pin, 3x10 ea    manual therapy techniques were applied to right hand for 15 minutes including:  - use of hand techniques, ASTYM/IASTM tools to increase blood flow/circulation, improve soft tissue pliability and decrease pain  - passive mobilization/PROM to right thumb MP joint, composite thumb flexion to improve joint flexibility    neuromuscular re-education activities to improve Coordination and Proprioception of right hand for 5 minutes including:  - - Isospheres, CW & CCW, 5 min  -   therapeutic activities to improve functional performance of right hand for 0 minutes including:         Home Exercises and Education Provided     Education provided:   - reviewed HEP compliance  - progress toward goals     Written Home Exercises Provided: Patient instructed to cont prior HEP  Exercises were reviewed and Frankie was able to demonstrate them prior to the end of the session. Frankie demonstrated good understanding of the  HEP provided.     See EMR under Patient Instructions for exercises provided during prior visit.        Assessment     Frankie presents today after seeing MD in follow up prior to appointment.  Identified continued MP joint stiffness that requires further therapy to address restrictions > surgical intervention. Progressed resistance with wrist strengthening and initiated thumb strengthening with 2# pinch pin for lateral and 3 point pinch.  Challenged with lateral pinch > 3 point pinch.  Patient able to participate in treatment session without increased complaints of pain.    Frankie is progressing towards her goals and there are no updates to goals at this time. Pt prognosis is Good.     Frankie will continue to benefit from skilled outpatient occupational therapy services to address the deficits listed in the problem list on initial evaluation, to provide pt/family education, and to maximize pt's level of independence in the home and community environment.     Pt's spiritual, cultural and educational needs considered and pt agreeable to plan of care and goals.    Anticipated barriers to occupational therapy: co-morbidity conditions, guarded behavior, pain     Goals:  Short Term Goals: (4 weeks)  1. Pt will be independent with HEP.   2. Pt will report decreased pain to a 4/10 with ADL/IADL tasks.   3. Pt will increase right thumb AROM by 10 degrees to enable dressing, grooming activities. - GOAL MET 1/31/2025  4. Pt will increase right wrist flex/ext AROM by 10 degrees to enable dressing, grooming activities. - GOAL MET  5. Pt will make a full, flat, composite fist to enable grasping and squeezing objects for self-care. - GOAL MET  6. Pt will report an increase in FOTO intake score of > 48%, which would indicate an improvement in quality of life. - GOAL MET  7. New goal 1/31/2025 - Pt will increase thumb MP joint flexion by 10 degrees to improve functional prehension and pinch for daily activities     Long Term  Goals: (8 weeks)  1. Pt will report decreased pain to 1-2/10 with ADL/IADL tasks.   2. Pt will exhibit increased right thumb AROM by 20 degrees to enable independence with self-care, meal preparation and work activities.   3. Pt will exhibit increased wrist flexion/extension AROM by 20 degrees to enable independence with self-care, meal preparation and work activities. - GOAL MET  4. Pt will exhibit 50%  strength of right hand as compared to left hand to allow a firm grasp on cooking utensils, steering wheel, etc.   5. Pt will exhibit 9# of right functional lateral pinch strength to allow writing, opening containers, and turning keys.   6. Pt will report an increase in FOTO intake score of > 60%, which would indicate an improvement in quality of life.     Plan     Plan of Care Certification: 12/9/2024 to 2/3/2025.  Updated Plan of Care Certification:  1/31/2025 to 3/14/2025     Outpatient Occupational Therapy 2-3 times weekly for 8 weeks to include the following interventions PRN: Fluidotherapy, Manual Therapy, Moist Heat/ Ice, Neuromuscular Re-ed, Orthotic Management and Training, Paraffin, Patient Education, Self Care, Therapeutic Activities, Therapeutic Exercise, and Ultrasound    Updates/Grading for next session: progress ROM as tolerated; thumb MP joint mobilizations for flexion range     AQUILINO Cramer, ELYT

## 2025-02-13 NOTE — ASSESSMENT & PLAN NOTE
The patient and I talked at length about the natural history and pathophysiology of her injury which is right thumb metacarpal phalangeal joint dislocation , she understands that this may lead to chronic problems which may have acute episodic exacerbations.   Symptoms may resolve, worsen and even become permanent.  The patient understands the treatment options including observation, activity modification, therapy, NSAIDs, splints and the surgical options including repair versus reconstruction of the right thumb ulnar collateral ligament.  We discussed the risks of the diagnosis and the treatment options including pain, infection, bleeding, damage to nerves and vessels, stiffness, scarring, incomplete relief or recurrence of symptoms, poor pain and functional outcomes.  Unique risks of this diagnosis and the treatment include persistent instability and arthritis.   She will continue working with therapy at this point.  She would like to try to get a little bit more motion out of the metacarpal phalangeal joint.  We discussed the possibility of tenolysis and capsulectomy and the risks associated with this including instability of the thumb.  We will see her back in 2 months

## 2025-02-14 ENCOUNTER — CLINICAL SUPPORT (OUTPATIENT)
Dept: REHABILITATION | Facility: HOSPITAL | Age: 35
End: 2025-02-14
Payer: COMMERCIAL

## 2025-02-14 DIAGNOSIS — R29.898 DECREASED PINCH STRENGTH: ICD-10-CM

## 2025-02-14 DIAGNOSIS — M25.541 PAIN IN THUMB JOINT WITH MOVEMENT OF RIGHT HAND: ICD-10-CM

## 2025-02-14 DIAGNOSIS — Z78.9 DECREASED ACTIVITIES OF DAILY LIVING (ADL): ICD-10-CM

## 2025-02-14 DIAGNOSIS — R29.898 DECREASED GRIP STRENGTH OF RIGHT HAND: ICD-10-CM

## 2025-02-14 DIAGNOSIS — M25.649 STIFFNESS OF THUMB JOINT: Primary | ICD-10-CM

## 2025-02-14 PROCEDURE — 97018 PARAFFIN BATH THERAPY: CPT

## 2025-02-14 PROCEDURE — 97140 MANUAL THERAPY 1/> REGIONS: CPT

## 2025-02-14 PROCEDURE — 97110 THERAPEUTIC EXERCISES: CPT

## 2025-02-14 PROCEDURE — 97035 APP MDLTY 1+ULTRASOUND EA 15: CPT

## 2025-02-14 NOTE — PROGRESS NOTES
[facility]  Occupational Therapy Treatment Note      Date: 2/14/2025  Name: Frankie Plaza  Clinic Number: [id number]    Therapy Diagnosis:   Encounter Diagnoses   Name Primary?    Stiffness of thumb joint Yes    Pain in thumb joint with movement of right hand     Decreased activities of daily living (ADL)     Decreased  strength of right hand     Decreased pinch strength        Physician: Buck Anders MD    Physician Orders: OT eval and tx  Medical Diagnosis: Closed dislocation of metacarpophalangeal joint of right thumb, initial encounter [S63.114A]   Surgical procedure and date:  N/A  MD Order Comments: OT Eval/treat     Evaluation Date: 12/9/2024  Insurance Authorization Period Expiration: 1/1/2025 to 12/31/2025  Plan of Care Certification Period: 12/9/2024 to 2/3/2025  Updated Plan of Care Certification Period: 1/31/2025 to 3/14/2025  Progress Note Due: 30 days (~2/28/2025)      Visit # / Visits authorized: 11 / 20  FOTO: 3/3  Scores:  initial = 38% / goal = 63% / Status 1/7/2025 = 53% / Updated status 1/31/2025 = 60%                                           Date of Return to MD: 4/17/2025   Precautions:  Standard    Time In: 12:55  Time Out: 13:55  Total Billable Time: 60 minutes    Subjective     Pt reports: I am not too sore from yesterday's visit, but that joint is still stiff.      She was compliant with home exercise program given on evaluation.  Response to previous treatment: therapy still helps to loosen my thumb up  Functional change: I have been writing really well with the Dr. Buckner pen, but not with a regular pen     Pain: 5-6/10 (4/10 last visit)  Location: thumb MP joint and thenar    Objective   Objective measures updated at progress report 1/31/2025.                              MP flexion post manual: 42     Treatment     Frankie received the treatments listed below:      direct contact modalities after being cleared for  contraindications for 8 minutes including:  - ultrasound to right thumb MP joint (circumferential), 3.3 MHz, 50% duty cycle, 0.8 w/cm2  to increase circulation and tissue extensibility and decrease pain    supervised modalities after being cleared for contradictions for 10 minutes including:  - paraffin to right hand with MHP pre-tx to decrease pain and increase joint mobility and tissue extensibility    therapeutic exercises to develop strength, endurance, ROM, and flexibility of right hand for 25 minutes including:  - thumb AROM with MP flex, composite ext/flex, opposition, palmar abduction - x10 reps ea  - STRENGTHENING:  - paraffin ball gripping/kneading, 5 min  - composite gripping w/ hand gripper, #2, 3x10   - wrist over wedge,3 positions w/ 2#, 3x10 ea  - 3 pt pinch & lateral pinch, 2# pin, 3x12 ea  - thumb to finger prehension w/ resistive pegs, x25    manual therapy techniques were applied to right hand for 15 minutes including:  - use of hand techniques, ASTYM/IASTM tools to increase blood flow/circulation, improve soft tissue pliability and decrease pain  - passive mobilization/PROM to right thumb MP joint, composite thumb flexion to improve joint flexibility    neuromuscular re-education activities to improve Coordination and Proprioception of right hand for 5 minutes including:  - - Isospheres, CW & CCW, 5 min    therapeutic activities to improve functional performance of right hand for 0 minutes including:         Home Exercises and Education Provided     Education provided:   - reviewed HEP compliance  - progress toward goals     Written Home Exercises Provided: Patient instructed to cont prior HEP  Exercises were reviewed and Frankie was able to demonstrate them prior to the end of the session. Frankie demonstrated good understanding of the HEP provided.     See EMR under Patient Instructions for exercises provided during prior visit.        Assessment     Hayesgibran presents today with slight  increased joint pain secondary to change in weather, but no complaints related to yesterday's treatment session.  Progressed with resisted pinching exercises that requires increased effort.  Demonstrates slight decrease in joint tissue of thumb MP joint with passive mobilization techniques.  Patient able to participate in treatment session without increased complaints of pain.    Frankie is progressing towards her goals and there are no updates to goals at this time. Pt prognosis is Good.     Frankie will continue to benefit from skilled outpatient occupational therapy services to address the deficits listed in the problem list on initial evaluation, to provide pt/family education, and to maximize pt's level of independence in the home and community environment.     Pt's spiritual, cultural and educational needs considered and pt agreeable to plan of care and goals.    Anticipated barriers to occupational therapy: co-morbidity conditions, guarded behavior, pain     Goals:  Short Term Goals: (4 weeks)  1. Pt will be independent with HEP.   2. Pt will report decreased pain to a 4/10 with ADL/IADL tasks.   3. Pt will increase right thumb AROM by 10 degrees to enable dressing, grooming activities. - GOAL MET 1/31/2025  4. Pt will increase right wrist flex/ext AROM by 10 degrees to enable dressing, grooming activities. - GOAL MET  5. Pt will make a full, flat, composite fist to enable grasping and squeezing objects for self-care. - GOAL MET  6. Pt will report an increase in FOTO intake score of > 48%, which would indicate an improvement in quality of life. - GOAL MET  7. New goal 1/31/2025 - Pt will increase thumb MP joint flexion by 10 degrees to improve functional prehension and pinch for daily activities     Long Term Goals: (8 weeks)  1. Pt will report decreased pain to 1-2/10 with ADL/IADL tasks.   2. Pt will exhibit increased right thumb AROM by 20 degrees to enable independence with self-care, meal preparation  and work activities.   3. Pt will exhibit increased wrist flexion/extension AROM by 20 degrees to enable independence with self-care, meal preparation and work activities. - GOAL MET  4. Pt will exhibit 50%  strength of right hand as compared to left hand to allow a firm grasp on cooking utensils, steering wheel, etc.   5. Pt will exhibit 9# of right functional lateral pinch strength to allow writing, opening containers, and turning keys.   6. Pt will report an increase in FOTO intake score of > 60%, which would indicate an improvement in quality of life.     Plan     Plan of Care Certification: 12/9/2024 to 2/3/2025.  Updated Plan of Care Certification:  1/31/2025 to 3/14/2025     Outpatient Occupational Therapy 2-3 times weekly for 8 weeks to include the following interventions PRN: Fluidotherapy, Manual Therapy, Moist Heat/ Ice, Neuromuscular Re-ed, Orthotic Management and Training, Paraffin, Patient Education, Self Care, Therapeutic Activities, Therapeutic Exercise, and Ultrasound    Updates/Grading for next session: progress ROM as tolerated; thumb MP joint mobilizations for flexion range     AQUILINO Cramer, CHT

## 2025-02-27 ENCOUNTER — CLINICAL SUPPORT (OUTPATIENT)
Dept: REHABILITATION | Facility: HOSPITAL | Age: 35
End: 2025-02-27
Payer: COMMERCIAL

## 2025-02-27 DIAGNOSIS — M25.649 STIFFNESS OF THUMB JOINT: Primary | ICD-10-CM

## 2025-02-27 DIAGNOSIS — Z78.9 DECREASED ACTIVITIES OF DAILY LIVING (ADL): ICD-10-CM

## 2025-02-27 DIAGNOSIS — R29.898 DECREASED PINCH STRENGTH: ICD-10-CM

## 2025-02-27 DIAGNOSIS — M25.541 PAIN IN THUMB JOINT WITH MOVEMENT OF RIGHT HAND: ICD-10-CM

## 2025-02-27 DIAGNOSIS — R29.898 DECREASED GRIP STRENGTH OF RIGHT HAND: ICD-10-CM

## 2025-02-27 PROCEDURE — 97140 MANUAL THERAPY 1/> REGIONS: CPT

## 2025-02-27 PROCEDURE — 97018 PARAFFIN BATH THERAPY: CPT

## 2025-02-27 PROCEDURE — 97110 THERAPEUTIC EXERCISES: CPT

## 2025-02-27 NOTE — PROGRESS NOTES
[facility]  Occupational Therapy Treatment Note & Progress Report     Date: 2/27/2025  Name: Frankie Plaza  [facility] Number: [id number]    Therapy Diagnosis:   Encounter Diagnoses   Name Primary?    Stiffness of thumb joint Yes    Pain in thumb joint with movement of right hand     Decreased activities of daily living (ADL)     Decreased  strength of right hand     Decreased pinch strength        Physician: Buck Anders MD    Physician Orders: OT eval and tx  Medical Diagnosis: Closed dislocation of metacarpophalangeal joint of right thumb, initial encounter [S63.114A]   Surgical procedure and date:  N/A  MD Order Comments: OT Eval/treat     Evaluation Date: 12/9/2024  Insurance Authorization Period Expiration: 1/1/2025 to 12/31/2025  Plan of Care Certification Period: 12/9/2024 to 2/3/2025  Updated Plan of Care Certification Period: 1/31/2025 to 3/14/2025  Progress Note Due: 30 days (~3/28/2025)      Visit # / Visits authorized: 12 / 20  FOTO: 4/3  Scores:  initial = 38% / goal = 63% / Status 1/7/2025 = 53% / 1/31/2025 = 60% / Updated status 2/27/2025 = 61%                                           Date of Return to MD: 4/17/2025   Precautions:  Standard    Time In: 13:32  Time Out: 14:30  Total Billable Time: 58 minutes    Subjective     Pt reports: I am doing better than the beginning.  The only issue I have is that I am sensitive to the cold weather.  I am only noticing 3-4 days out of 7 that I wake up in the morning with soreness.      She was compliant with home exercise program given initially and in follow-up.  Response to previous treatment: therapy still helps to loosen my thumb up  Functional change: my strength and my handwriting are mainly the things that challenge me; I can write better with the Dr. Buckner pen than I can with a regular pen; still notice weakness to pinch and open things; not dropping things as much as I use to     Pain: 4/10 (5-6/10  last visit)  Location: thumb MP joint and thenar    Objective   Objective measures updated at progress report 2/27/2025.                              Edema: Circumferential measurements (in centimeters)    Right Right Right Right Left     12/9/2024 1/7/2025 1/31/2025 2/27/2025 12/9/2024   Wrist 19.0 18.0 17.9 17.7 18.9   Thumb prox phalanx 8.3    7.2 7.3 7.1 7.1   Thumb IP jt 7.2 6.7 6.7 6.6 6.7         Right Upper Extremity Active Range of Motion:      Right Right Right Right Left   ROM (in degrees) 12/9/2024 1/7/2025 1/31/2025 2/27/2025 12/9/2024   Wrist flexion 55 82 83 88 73   Wrist extension 41 55 56 65 65          Right Thumb Active Range of Motion:     Right Right Right Right Left   (Ext/Flex) 12/9/2024 1/7/2025 1/31/2025 2/27/2025 12/9/2024   MCP Jt 0/15° 0/40 0/40 0/37->42* 0/55   IP Jt +5/35° +10/45 +10/47 +15/50 +5/56   Palmar Abd 35° 50 60 60 62        *post manual                                                      and Pinch Strength (in pounds, psi's): initial measurements recorded 1/31/2025    Right Right Left     1/31/2025 2/27/2025 1/31/2025    II 25 41 78   Lateral deferred 8.5 19.5   Tripod deferred 9 22      Treatment     Frankie received the treatments listed below:      direct contact modalities after being cleared for contraindications for 0 minutes including:    supervised modalities after being cleared for contradictions for 8 minutes including:  - paraffin to right hand with MHP pre-tx to decrease pain and increase joint mobility and tissue extensibility    therapeutic exercises to develop strength, endurance, ROM, and flexibility of right hand for 30 minutes including:  - thumb AROM with MP flex, composite ext/flex, opposition, palmar abduction - x10 reps ea  - girth, ROM, /pinch strength, FOTO w/ review of status  STRENGTHENING:  - paraffin ball gripping/kneading, 5 min  - composite gripping w/ hand gripper, #2, 3x10   - wrist over wedge,3 positions w/ 2#, 3x10 ea  - -  GINI-putty, orange, obj location x6, roll/3 point pinch, lateral pinch, 3 point pinch & pull, w instruct for HEP    manual therapy techniques were applied to right hand for 15 minutes including:  - use of hand techniques, ASTYM/IASTM tools to increase blood flow/circulation, improve soft tissue pliability and decrease pain  - passive mobilization/PROM to right thumb MP joint, composite thumb flexion to improve joint flexibility    neuromuscular re-education activities to improve Coordination and Proprioception of right hand for 5 minutes including:  - - Isospheres, CW & CCW, 5 min    therapeutic activities to improve functional performance of right hand for 0 minutes including:         Home Exercises and Education Provided     Education provided:   - reviewed HEP and instructed in T-putty exercises to add to HEP   - progress toward goals     Written Home Exercises Provided: Patient instructed to cont prior HEP  Exercises were reviewed and Frankie was able to demonstrate them prior to the end of the session. Frankie demonstrated good understanding of the HEP provided.     See EMR under Patient Instructions for exercises provided during prior visit.        Assessment     Frankie is seen for her 16th visit including evaluation.  Thumb MP joint continues with moderate mobility restrictions while able to increase by 5 degrees following manual techniques.  Demonstrates increasing  and pinch strength efforts, but continues to be area of limitations with functional activities requiring strength.  Instructed in  and pinch strengthening exercises for HEP using T-putty.  Complaints of AM pain is decreasing in frequency as compared to previous status.  Patient able to participate in treatment session without increased complaints of pain.    Frankie is progressing towards her goals and there are no updates to goals at this time. Pt prognosis is Good.     Frankie will continue to benefit from skilled outpatient  occupational therapy services to address the deficits listed in the problem list on initial evaluation, to provide pt/family education, and to maximize pt's level of independence in the home and community environment.     Pt's spiritual, cultural and educational needs considered and pt agreeable to plan of care and goals.    Anticipated barriers to occupational therapy: co-morbidity conditions, guarded behavior, pain     Goals:  Short Term Goals: (4 weeks)  1. Pt will be independent with HEP. - ONGOING GOAL  2. Pt will report decreased pain to a 4/10 with ADL/IADL tasks. - PARTIALLY MET  3. Pt will increase right thumb AROM by 10 degrees to enable dressing, grooming activities. - GOAL MET 1/31/2025  4. Pt will increase right wrist flex/ext AROM by 10 degrees to enable dressing, grooming activities. - GOAL MET  5. Pt will make a full, flat, composite fist to enable grasping and squeezing objects for self-care. - GOAL MET  6. Pt will report an increase in FOTO intake score of > 48%, which would indicate an improvement in quality of life. - GOAL MET  7. New goal 1/31/2025 - Pt will increase thumb MP joint flexion by 10 degrees to improve functional prehension and pinch for daily activities - PROGRESSING     Long Term Goals: (8 weeks)  1. Pt will report decreased pain to 1-2/10 with ADL/IADL tasks. - PARTIALLY MET  2. Pt will exhibit increased right thumb AROM by 20 degrees to enable independence with self-care, meal preparation and work activities. - PROGRESSING  3. Pt will exhibit increased wrist flexion/extension AROM by 20 degrees to enable independence with self-care, meal preparation and work activities. - GOAL MET  4. Pt will exhibit 50%  strength of right hand as compared to left hand to allow a firm grasp on cooking utensils, steering wheel, etc. - PROGRESSING  5. Pt will exhibit 9# of right functional lateral pinch strength to allow writing, opening containers, and turning keys. - PROGRESSING  6. Pt will  report an increase in FOTO intake score of > 60%, which would indicate an improvement in quality of life. - GOAL MET 2/27/2025    Plan     Plan of Care Certification: 12/9/2024 to 2/3/2025.  Updated Plan of Care Certification:  1/31/2025 to 3/14/2025     Outpatient Occupational Therapy 2-3 times weekly for 8 weeks to include the following interventions PRN: Fluidotherapy, Manual Therapy, Moist Heat/ Ice, Neuromuscular Re-ed, Orthotic Management and Training, Paraffin, Patient Education, Self Care, Therapeutic Activities, Therapeutic Exercise, and Ultrasound    Updates/Grading for next session: thumb MP joint mobilizations for flexion range and thumb strengthening    AQUILINO Cramer, CHT

## 2025-02-28 ENCOUNTER — CLINICAL SUPPORT (OUTPATIENT)
Dept: REHABILITATION | Facility: HOSPITAL | Age: 35
End: 2025-02-28
Payer: COMMERCIAL

## 2025-02-28 DIAGNOSIS — Z78.9 DECREASED ACTIVITIES OF DAILY LIVING (ADL): ICD-10-CM

## 2025-02-28 DIAGNOSIS — R29.898 DECREASED PINCH STRENGTH: ICD-10-CM

## 2025-02-28 DIAGNOSIS — M25.649 STIFFNESS OF THUMB JOINT: Primary | ICD-10-CM

## 2025-02-28 DIAGNOSIS — M25.541 PAIN IN THUMB JOINT WITH MOVEMENT OF RIGHT HAND: ICD-10-CM

## 2025-02-28 DIAGNOSIS — R29.898 DECREASED GRIP STRENGTH OF RIGHT HAND: ICD-10-CM

## 2025-02-28 PROCEDURE — 97018 PARAFFIN BATH THERAPY: CPT

## 2025-02-28 PROCEDURE — 97110 THERAPEUTIC EXERCISES: CPT

## 2025-02-28 PROCEDURE — 97140 MANUAL THERAPY 1/> REGIONS: CPT

## 2025-02-28 NOTE — PROGRESS NOTES
[facility]  Occupational Therapy Treatment Note      Date: 2/28/2025  [id number]    Therapy Diagnosis:   Encounter Diagnoses   Name Primary?    Stiffness of thumb joint Yes    Pain in thumb joint with movement of right hand     Decreased activities of daily living (ADL)     Decreased  strength of right hand     Decreased pinch strength        Physician: Buck Anders MD    Physician Orders: OT eval and tx  Medical Diagnosis: Closed dislocation of metacarpophalangeal joint of right thumb, initial encounter [S63.114A]   Surgical procedure and date:  N/A  MD Order Comments: OT Eval/treat     Evaluation Date: 12/9/2024  Insurance Authorization Period Expiration: 1/1/2025 to 12/31/2025  Plan of Care Certification Period: 12/9/2024 to 2/3/2025  Updated Plan of Care Certification Period: 1/31/2025 to 3/14/2025  Progress Note Due: 30 days (~3/28/2025)      Visit # / Visits authorized: 13 / 20  FOTO: 4/3  Scores:  initial = 38% / goal = 63% / Status 1/7/2025 = 53% / 1/31/2025 = 60% / Updated status 2/27/2025 = 61%                                           Date of Return to MD: 4/17/2025   Precautions:  Standard    Time In: 12:30  Time Out: 13:30  Total Billable Time: 60 minutes    Subjective     Pt reports: I still want to see if we can loosen this joint so I can bend it better.      She was compliant with home exercise program given initially and in follow-up.  Response to previous treatment: therapy still helps to loosen my thumb up  Functional change: able to use my thumb/hand to take care of my personal needs without any real difficulty     Pain: 4/10 (4/10 last visit)  Location: thumb MP joint and thenar    Objective   Objective measures updated at progress report 2/27/2025.                                         Treatment     rFankie received the treatments listed below:      direct contact modalities after being cleared for  contraindications for 0 minutes including:    supervised modalities after being cleared for contradictions for 8 minutes including:  - paraffin to right hand with MHP pre-tx to decrease pain and increase joint mobility and tissue extensibility    therapeutic exercises to develop strength, endurance, ROM, and flexibility of right hand for 35 minutes including:  - thumb AROM with MP flex, composite ext/flex, opposition, palmar abduction - x10 reps ea  - girth, ROM, /pinch strength, FOTO w/ review of status  STRENGTHENING:  - paraffin ball gripping/kneading, 5 min  - composite gripping w/ hand gripper, #2, 3x10   - wrist flex/ext/deviations over wedge,3 positions w/ 2#, 3x10 ea  - forearm pronator, 1#, 3x10  - 3 pt pinch & lateral pinch, 4# pin, 2x10 ea  - isolated & composite finger flexion, Digi-flex, red, 2x10 ea  - T-putty, orange, obj location x6, roll/3 point pinch, lateral pinch, 3 point pinch & pull, w review of HEP    manual therapy techniques were applied to right hand for 12 minutes including:  - use of hand techniques, ASTYM/IASTM tools to increase blood flow/circulation, improve soft tissue pliability and decrease pain  - passive mobilization/PROM to right thumb MP joint, composite thumb flexion to improve joint flexibility    neuromuscular re-education activities to improve Coordination and Proprioception of right hand for 5 minutes including:  - - Isospheres w/ space, CW, 5 min    therapeutic activities to improve functional performance of right hand for 0 minutes including:         Home Exercises and Education Provided     Education provided:   - reviewed HEP and instructed in T-putty exercises to add to HEP   - progress toward goals     Written Home Exercises Provided: Patient instructed to cont prior HEP  Exercises were reviewed and Frankie was able to demonstrate them prior to the end of the session. Frankie demonstrated good understanding of the HEP provided.     See EMR under Patient  Instructions for exercises provided during prior visit.        Assessment     Frankie is seen for her 17th visit including evaluation.  Progressed thumb control activity with Isospheres to maintain separation which requires greater thumb control.  Reviewed  and pinch strengthening exercises for T-putty HEP.  Patient able to participate in treatment session without increased complaints of pain.    Frankie is progressing towards her goals and there are no updates to goals at this time. Pt prognosis is Good.     Frankie will continue to benefit from skilled outpatient occupational therapy services to address the deficits listed in the problem list on initial evaluation, to provide pt/family education, and to maximize pt's level of independence in the home and community environment.     Pt's spiritual, cultural and educational needs considered and pt agreeable to plan of care and goals.    Anticipated barriers to occupational therapy: co-morbidity conditions, guarded behavior, pain     Goals:  Short Term Goals: (4 weeks)  1. Pt will be independent with HEP. - ONGOING GOAL  2. Pt will report decreased pain to a 4/10 with ADL/IADL tasks. - PROGRESSING  3. Pt will increase right thumb AROM by 10 degrees to enable dressing, grooming activities. - GOAL MET 1/31/2025  4. Pt will increase right wrist flex/ext AROM by 10 degrees to enable dressing, grooming activities. - GOAL MET  5. Pt will make a full, flat, composite fist to enable grasping and squeezing objects for self-care. - GOAL MET  6. Pt will report an increase in FOTO intake score of > 48%, which would indicate an improvement in quality of life. - GOAL MET  7. New goal 1/31/2025 - Pt will increase thumb MP joint flexion by 10 degrees to improve functional prehension and pinch for daily activities - PROGRESSING     Long Term Goals: (8 weeks)  1. Pt will report decreased pain to 1-2/10 with ADL/IADL tasks. - PARTIALLY MET  2. Pt will exhibit increased right  thumb AROM by 20 degrees to enable independence with self-care, meal preparation and work activities. - PROGRESSING  3. Pt will exhibit increased wrist flexion/extension AROM by 20 degrees to enable independence with self-care, meal preparation and work activities. - GOAL MET  4. Pt will exhibit 50%  strength of right hand as compared to left hand to allow a firm grasp on cooking utensils, steering wheel, etc. - PROGRESSING  5. Pt will exhibit 9# of right functional lateral pinch strength to allow writing, opening containers, and turning keys. - PROGRESSING  6. Pt will report an increase in FOTO intake score of > 60%, which would indicate an improvement in quality of life. - GOAL MET 2/27/2025    Plan     Plan of Care Certification: 12/9/2024 to 2/3/2025.  Updated Plan of Care Certification:  1/31/2025 to 3/14/2025     Outpatient Occupational Therapy 2-3 times weekly for 8 weeks to include the following interventions PRN: Fluidotherapy, Manual Therapy, Moist Heat/ Ice, Neuromuscular Re-ed, Orthotic Management and Training, Paraffin, Patient Education, Self Care, Therapeutic Activities, Therapeutic Exercise, and Ultrasound    Updates/Grading for next session: thumb MP joint mobilizations for flexion range and thumb strengthening    AQUILINO Cramer, CHT

## 2025-03-06 ENCOUNTER — CLINICAL SUPPORT (OUTPATIENT)
Dept: REHABILITATION | Facility: HOSPITAL | Age: 35
End: 2025-03-06
Payer: COMMERCIAL

## 2025-03-06 DIAGNOSIS — R29.898 DECREASED PINCH STRENGTH: ICD-10-CM

## 2025-03-06 DIAGNOSIS — M25.649 STIFFNESS OF THUMB JOINT: Primary | ICD-10-CM

## 2025-03-06 DIAGNOSIS — M25.541 PAIN IN THUMB JOINT WITH MOVEMENT OF RIGHT HAND: ICD-10-CM

## 2025-03-06 DIAGNOSIS — R29.898 DECREASED GRIP STRENGTH OF RIGHT HAND: ICD-10-CM

## 2025-03-06 DIAGNOSIS — Z78.9 DECREASED ACTIVITIES OF DAILY LIVING (ADL): ICD-10-CM

## 2025-03-06 PROCEDURE — 97110 THERAPEUTIC EXERCISES: CPT

## 2025-03-06 PROCEDURE — 97018 PARAFFIN BATH THERAPY: CPT

## 2025-03-06 PROCEDURE — 97530 THERAPEUTIC ACTIVITIES: CPT

## 2025-03-06 PROCEDURE — 97140 MANUAL THERAPY 1/> REGIONS: CPT

## 2025-03-06 NOTE — PROGRESS NOTES
[facility]  Occupational Therapy Treatment Note      Date: 3/6/2025  Name: Frankie Plaza  Clinic Number: [id number]    Therapy Diagnosis:   Encounter Diagnoses   Name Primary?    Stiffness of thumb joint Yes    Pain in thumb joint with movement of right hand     Decreased activities of daily living (ADL)     Decreased  strength of right hand     Decreased pinch strength        Physician: Buck Anders MD    Physician Orders: OT eval and tx  Medical Diagnosis: Closed dislocation of metacarpophalangeal joint of right thumb, initial encounter [S63.114A]   Surgical procedure and date:  N/A  MD Order Comments: OT Eval/treat     Evaluation Date: 12/9/2024  Insurance Authorization Period Expiration: 1/1/2025 to 12/31/2025  Plan of Care Certification Period: 12/9/2024 to 2/3/2025  Updated Plan of Care Certification Period: 1/31/2025 to 3/14/2025  Progress Note Due: 30 days (~3/28/2025)      Visit # / Visits authorized: 14 / 20  FOTO: 4/3  Scores:  initial = 38% / goal = 63% / Status 1/7/2025 = 53% / 1/31/2025 = 60% / Updated status 2/27/2025 = 61%                                           Date of Return to MD: 4/17/2025   Precautions:  Standard    Time In: 13:00  Time Out: 14:00  Total Billable Time: 60 minutes    Subjective     Pt reports: It has been hurting the past couple of days and feels more like an ache, but it is tolerable.  That joint still feels stiff and I think it is going to be that way.       She was compliant with home exercise program given initially and in follow-up.  Response to previous treatment: therapy still helps to loosen my thumb up  Functional change: I can't open things with my thumb and index finger like a juice top, medication; my handwriting is starting to look like my normal handwriting    Pain: 5/10 (4/10 last visit)  Location: thumb MP joint and thenar    Objective   Objective measures updated at progress report 2/27/2025.                                    Treatment     Frankie received the treatments listed below:      supervised modalities after being cleared for contradictions for 8 minutes including:  - paraffin to right hand with MHP pre-tx to decrease pain and increase joint mobility and tissue extensibility    therapeutic exercises to develop strength, endurance, ROM, and flexibility of right hand for 18 minutes including:  - thumb AROM with MP flex, composite ext/flex, opposition, palmar abduction - x10 reps ea  - STRENGTHENING:  - wrist flex/ext/deviations over wedge,3 positions w/ 3#, 3x10 ea  - forearm pronator, 2#, 3x10  - isolated & composite finger flexion, Digi-flex, red, 2x10 ea  - T-putty, orange, obj location x6, roll/3 point pinch, lateral pinch, 3 point pinch & pull, w review of HEP; instructed in technique to use bottle top/medicine top to turn/twist in putty    manual therapy techniques were applied to right hand for 12 minutes including:  - use of hand techniques, ASTYM/IASTM tools to increase blood flow/circulation, improve soft tissue pliability and decrease pain  - passive mobilization/PROM to right thumb MP joint, composite thumb flexion to improve joint flexibility    neuromuscular re-education activities to improve Coordination and Proprioception of right hand for 0 minutes including:  - -   therapeutic activities to improve functional performance of right hand for 22 minutes including:  - paraffin ball gripping/kneading, 5 min  - composite gripping w/ hand gripper, #2, 3x10  - pinching activity w/ 3 pt & lateral, 6# pin, 3x10 ea  - jar lid, bottle top, key turning w/ orange base putty, 2' ea       Home Exercises and Education Provided     Education provided:   - reviewed HEP including T-putty exercises, instructed to add bottle top/medicine top turning  - progress toward goals     Written Home Exercises Provided: Patient instructed to cont prior HEP  Exercises were reviewed and Frankie was able to demonstrate them  prior to the end of the session. Frankie demonstrated good understanding of the HEP provided.     See EMR under Patient Instructions for exercises provided during prior visit.        Assessment     Frankie is seen for her 18th visit including evaluation.  Added functional activity with thumb/ strength control to open large jar and bottle top in addition to key turning.  Progressed resistance with wrist and forearm strengthening that patient completes with good control.  Patient able to participate in treatment session without increased complaints of pain.    Frankie is progressing towards her goals and there are no updates to goals at this time. Pt prognosis is Good.     Frankie will continue to benefit from skilled outpatient occupational therapy services to address the deficits listed in the problem list on initial evaluation, to provide pt/family education, and to maximize pt's level of independence in the home and community environment.     Pt's spiritual, cultural and educational needs considered and pt agreeable to plan of care and goals.    Anticipated barriers to occupational therapy: co-morbidity conditions, guarded behavior, pain     Goals:  Short Term Goals: (4 weeks)  1. Pt will be independent with HEP. - ONGOING GOAL  2. Pt will report decreased pain to a 4/10 with ADL/IADL tasks. - PROGRESSING  3. Pt will increase right thumb AROM by 10 degrees to enable dressing, grooming activities. - GOAL MET 1/31/2025  4. Pt will increase right wrist flex/ext AROM by 10 degrees to enable dressing, grooming activities. - GOAL MET  5. Pt will make a full, flat, composite fist to enable grasping and squeezing objects for self-care. - GOAL MET  6. Pt will report an increase in FOTO intake score of > 48%, which would indicate an improvement in quality of life. - GOAL MET  7. New goal 1/31/2025 - Pt will increase thumb MP joint flexion by 10 degrees to improve functional prehension and pinch for daily  activities - PROGRESSING     Long Term Goals: (8 weeks)  1. Pt will report decreased pain to 1-2/10 with ADL/IADL tasks. - PROGRESSING  2. Pt will exhibit increased right thumb AROM by 20 degrees to enable independence with self-care, meal preparation and work activities. - PROGRESSING  3. Pt will exhibit increased wrist flexion/extension AROM by 20 degrees to enable independence with self-care, meal preparation and work activities. - GOAL MET  4. Pt will exhibit 50%  strength of right hand as compared to left hand to allow a firm grasp on cooking utensils, steering wheel, etc. - PROGRESSING  5. Pt will exhibit 9# of right functional lateral pinch strength to allow writing, opening containers, and turning keys. - PROGRESSING  6. Pt will report an increase in FOTO intake score of > 60%, which would indicate an improvement in quality of life. - GOAL MET 2/27/2025    Plan     Plan of Care Certification: 12/9/2024 to 2/3/2025.  Updated Plan of Care Certification:  1/31/2025 to 3/14/2025     Outpatient Occupational Therapy 2-3 times weekly for 8 weeks to include the following interventions PRN: Fluidotherapy, Manual Therapy, Moist Heat/ Ice, Neuromuscular Re-ed, Orthotic Management and Training, Paraffin, Patient Education, Self Care, Therapeutic Activities, Therapeutic Exercise, and Ultrasound    Updates/Grading for next session: thumb MP joint mobilizations for flexion range and thumb strengthening    AQUILINO Cramer, CHT

## 2025-03-07 ENCOUNTER — CLINICAL SUPPORT (OUTPATIENT)
Dept: REHABILITATION | Facility: HOSPITAL | Age: 35
End: 2025-03-07
Payer: COMMERCIAL

## 2025-03-07 DIAGNOSIS — M25.541 PAIN IN THUMB JOINT WITH MOVEMENT OF RIGHT HAND: ICD-10-CM

## 2025-03-07 DIAGNOSIS — R29.898 DECREASED PINCH STRENGTH: ICD-10-CM

## 2025-03-07 DIAGNOSIS — R29.898 DECREASED GRIP STRENGTH OF RIGHT HAND: ICD-10-CM

## 2025-03-07 DIAGNOSIS — M25.649 STIFFNESS OF THUMB JOINT: Primary | ICD-10-CM

## 2025-03-07 DIAGNOSIS — Z78.9 DECREASED ACTIVITIES OF DAILY LIVING (ADL): ICD-10-CM

## 2025-03-07 PROCEDURE — 97530 THERAPEUTIC ACTIVITIES: CPT

## 2025-03-07 PROCEDURE — 97018 PARAFFIN BATH THERAPY: CPT

## 2025-03-07 PROCEDURE — 97140 MANUAL THERAPY 1/> REGIONS: CPT

## 2025-03-07 PROCEDURE — 97110 THERAPEUTIC EXERCISES: CPT

## 2025-03-07 NOTE — PROGRESS NOTES
[facility]  Occupational Therapy Treatment Note      Date: 3/7/2025  Name: Frankie Plaza  Clinic Number: [id number]    Therapy Diagnosis:   Encounter Diagnoses   Name Primary?    Stiffness of thumb joint Yes    Pain in thumb joint with movement of right hand     Decreased activities of daily living (ADL)     Decreased  strength of right hand     Decreased pinch strength        Physician: Buck Anders MD    Physician Orders: OT eval and tx  Medical Diagnosis: Closed dislocation of metacarpophalangeal joint of right thumb, initial encounter [S63.114A]   Surgical procedure and date:  N/A  MD Order Comments: OT Eval/treat     Evaluation Date: 12/9/2024  Insurance Authorization Period Expiration: 1/1/2025 to 12/31/2025  Plan of Care Certification Period: 12/9/2024 to 2/3/2025  Updated Plan of Care Certification Period: 1/31/2025 to 3/14/2025  Progress Note Due: 30 days (~3/28/2025)      Visit # / Visits authorized: 15 / 20  FOTO: 4/3  Scores:  initial = 38% / goal = 63% / Status 1/7/2025 = 53% / 1/31/2025 = 60% / Updated status 2/27/2025 = 61%                                           Date of Return to MD: 4/17/2025   Precautions:  Standard    Time In: 13:00  Time Out: 14:00  Total Billable Time: 60 minutes    Subjective     Pt reports: My thumb was feeling exceptionally stiff this morning.  I feel like the putty could be stronger when I am working it.      She was compliant with home exercise program given initially and in follow-up.  Response to previous treatment: therapy has helped to make my thumb stronger and it loosens up the joint some  Functional change: can still notice some weakness in my thumb when opening drink bottles and doing a lot of writing    Pain: 5-6/10 (5/10 last visit)  Location: thumb MP joint and thenar    Objective   Objective measures updated at progress report 2/27/2025.                                   Treatment     Frankie received  the treatments listed below:      supervised modalities after being cleared for contradictions for 8 minutes including:  - paraffin to right hand with MHP pre-tx to decrease pain and increase joint mobility and tissue extensibility    therapeutic exercises to develop strength, endurance, ROM, and flexibility of right hand for 8 minutes including:  - thumb AROM with MP flex, composite ext/flex, opposition, palmar abduction - x10 reps ea  - STRENGTHENING:  - - forearm pronator, 2#, 3x10  - - T-putty, orange+green, obj location x6, roll/3 point pinch, lateral pinch, 3 point pinch & pull, w review of HEP; reviewed in technique to use bottle top/medicine top to turn/twist in putty    manual therapy techniques were applied to right hand for 12 minutes including:  - use of hand techniques, ASTYM/IASTM tools to increase blood flow/circulation, improve soft tissue pliability and decrease pain  - passive mobilization/PROM to right thumb MP joint, composite thumb flexion to improve joint flexibility    neuromuscular re-education activities to improve Coordination and Proprioception of right hand for 0 minutes including:  - -   therapeutic activities to improve functional performance of right hand for 22 minutes including:  - paraffin ball gripping/kneading, 5 min  - composite gripping w/ hand gripper, #2, 3x10  - pinching activity w/ 3 pt & lateral, 6# pin, 3x10 ea  - jar lid, bottle top, key turning w/ orange/green base putty, 2' ea       Home Exercises and Education Provided     Education provided:   - reviewed HEP including T-putty exercises, reviewed technique w/ bottle top/medicine top turning  - progress toward goals     Written Home Exercises Provided: Patient instructed to cont prior HEP  Exercises were reviewed and Frankie was able to demonstrate them prior to the end of the session. Frankie demonstrated good understanding of the HEP provided.     See EMR under Patient Instructions for exercises provided during  prior visit.        Assessment     Frankie is seen for her 19th visit including evaluation.  Progressed resistance with therapy putty for home use adding firm putty to current medium.  Verbal cues provided with forearm strengthening exercise to improve control with  and rotation.  Patient able to participate in treatment session without increased complaints of pain.    Frankie is progressing towards her goals and there are no updates to goals at this time. Pt prognosis is Good.     Frankie will continue to benefit from skilled outpatient occupational therapy services to address the deficits listed in the problem list on initial evaluation, to provide pt/family education, and to maximize pt's level of independence in the home and community environment.     Pt's spiritual, cultural and educational needs considered and pt agreeable to plan of care and goals.    Anticipated barriers to occupational therapy: co-morbidity conditions, guarded behavior, pain     Goals:  Short Term Goals: (4 weeks)  1. Pt will be independent with HEP. - ONGOING GOAL  2. Pt will report decreased pain to a 4/10 with ADL/IADL tasks. - PROGRESSING  3. Pt will increase right thumb AROM by 10 degrees to enable dressing, grooming activities. - GOAL MET 1/31/2025  4. Pt will increase right wrist flex/ext AROM by 10 degrees to enable dressing, grooming activities. - GOAL MET  5. Pt will make a full, flat, composite fist to enable grasping and squeezing objects for self-care. - GOAL MET  6. Pt will report an increase in FOTO intake score of > 48%, which would indicate an improvement in quality of life. - GOAL MET  7. New goal 1/31/2025 - Pt will increase thumb MP joint flexion by 10 degrees to improve functional prehension and pinch for daily activities - PROGRESSING     Long Term Goals: (8 weeks)  1. Pt will report decreased pain to 1-2/10 with ADL/IADL tasks. - PROGRESSING  2. Pt will exhibit increased right thumb AROM by 20 degrees to  enable independence with self-care, meal preparation and work activities. - PROGRESSING  3. Pt will exhibit increased wrist flexion/extension AROM by 20 degrees to enable independence with self-care, meal preparation and work activities. - GOAL MET  4. Pt will exhibit 50%  strength of right hand as compared to left hand to allow a firm grasp on cooking utensils, steering wheel, etc. - PROGRESSING  5. Pt will exhibit 9# of right functional lateral pinch strength to allow writing, opening containers, and turning keys. - PROGRESSING  6. Pt will report an increase in FOTO intake score of > 60%, which would indicate an improvement in quality of life. - GOAL MET 2/27/2025    Plan     Plan of Care Certification: 12/9/2024 to 2/3/2025.  Updated Plan of Care Certification:  1/31/2025 to 3/14/2025     Outpatient Occupational Therapy 2-3 times weekly for 8 weeks to include the following interventions PRN: Fluidotherapy, Manual Therapy, Moist Heat/ Ice, Neuromuscular Re-ed, Orthotic Management and Training, Paraffin, Patient Education, Self Care, Therapeutic Activities, Therapeutic Exercise, and Ultrasound    Updates/Grading for next session: thumb MP joint mobilizations for flexion range and thumb strengthening; plan to progress to discharge next week with end of POC    AQUILINO Cramer, ELYT

## 2025-03-31 ENCOUNTER — DOCUMENTATION ONLY (OUTPATIENT)
Dept: REHABILITATION | Facility: HOSPITAL | Age: 35
End: 2025-03-31
Payer: COMMERCIAL

## 2025-03-31 NOTE — PROGRESS NOTES
Patient was last seen in OT on 3/7/2025.  Her last 2 scheduled appointments on 3/13/2025 and 3/14/2025 were no show and cancellation.      Her most recent objective measurements were recorded at visit of 2/27/2025.  Her ROM continued to be as noted with improving  and pinch strength.     Contacted patient in follow up on 3/17/2025 and discussed continuing with HEP until MD follow-up as plans were to discharge at last visit.      Plan to follow up with patient after MD appointment to determine plan or discharge of episode.     AQUILINO Isaacs, CHT, CLT

## 2025-04-17 ENCOUNTER — HOSPITAL ENCOUNTER (OUTPATIENT)
Dept: RADIOLOGY | Facility: HOSPITAL | Age: 35
Discharge: HOME | End: 2025-04-17
Attending: ORTHOPAEDIC SURGERY
Payer: COMMERCIAL

## 2025-04-17 ENCOUNTER — OFFICE VISIT (OUTPATIENT)
Dept: ORTHOPEDICS | Facility: CLINIC | Age: 35
End: 2025-04-17
Payer: COMMERCIAL

## 2025-04-17 ENCOUNTER — PATIENT MESSAGE (OUTPATIENT)
Dept: ORTHOPEDICS | Facility: CLINIC | Age: 35
End: 2025-04-17

## 2025-04-17 DIAGNOSIS — S63.114D: ICD-10-CM

## 2025-04-17 DIAGNOSIS — S63.114D: Primary | ICD-10-CM

## 2025-04-17 PROCEDURE — 1159F MED LIST DOCD IN RCRD: CPT | Mod: CPTII,S$GLB,, | Performed by: ORTHOPAEDIC SURGERY

## 2025-04-17 PROCEDURE — 99213 OFFICE O/P EST LOW 20 MIN: CPT | Mod: S$GLB,,, | Performed by: ORTHOPAEDIC SURGERY

## 2025-04-17 PROCEDURE — 99999 PR PBB SHADOW E&M-EST. PATIENT-LVL II: CPT | Mod: PBBFAC,,, | Performed by: ORTHOPAEDIC SURGERY

## 2025-04-17 PROCEDURE — 73140 X-RAY EXAM OF FINGER(S): CPT | Mod: TC,RT

## 2025-04-17 RX ORDER — BUPROPION HYDROCHLORIDE 150 MG/1
150 TABLET ORAL EVERY MORNING
COMMUNITY

## 2025-04-17 NOTE — ASSESSMENT & PLAN NOTE
The patient and I talked at length about the natural history and pathophysiology of her injury which is right thumb metacarpal phalangeal joint dislocation , she understands that this may lead to chronic problems which may have acute episodic exacerbations.   Symptoms may resolve, worsen and even become permanent.  The patient understands the treatment options including observation, activity modification, therapy, NSAIDs, splints and the surgical options including repair versus reconstruction of the right thumb ulnar collateral ligament.  We discussed the risks of the diagnosis and the treatment options including pain, infection, bleeding, damage to nerves and vessels, stiffness, scarring, incomplete relief or recurrence of symptoms, poor pain and functional outcomes.  Unique risks of this diagnosis and the treatment include persistent instability and arthritis.   She is happy with the result she may return back to full duty on May 15th.  I will see her back as needed

## 2025-04-17 NOTE — PROGRESS NOTES
JULIÁN Anders M.D.  Orthopaedic Hand and Wrist Surgery  [facility]    Patient ID: Frankie Plaza  YOB: 1990  MRN: [id number]    Date of Injury:  November 10, 2024     Diagnosis:   Right thumb MCP dislocation    History of Present Illness: Frankie Plaza is a 35 y.o. female she is doing better she feels like her range of motion and function has improved.  She still has some stiffness which is expected.    Patient was queried and this is the extent of the patients current complaints today.    Physical Exam:   Skin:  No open wounds  Sensation:  No decreased sensation of the thumb  Motor:  Intact EPL FPL  Swelling:  Minimal  Thumb IP joint range of motion is from + 10-80  Thumb MP joint range of motion is from +10-10   No instability of the thumb MP joint  Kapandji score is 8    Imaging:  Right thumb x-rays show a located MP joint with post traumatic changes.    Provider Note/Medical Decision Makin. Closed dislocation of metacarpophalangeal joint of right thumb, subsequent encounter  Assessment & Plan:  The patient and I talked at length about the natural history and pathophysiology of her injury which is right thumb metacarpal phalangeal joint dislocation , she understands that this may lead to chronic problems which may have acute episodic exacerbations.   Symptoms may resolve, worsen and even become permanent.  The patient understands the treatment options including observation, activity modification, therapy, NSAIDs, splints and the surgical options including repair versus reconstruction of the right thumb ulnar collateral ligament.  We discussed the risks of the diagnosis and the treatment options including pain, infection, bleeding, damage to nerves and vessels, stiffness, scarring, incomplete relief or recurrence of symptoms, poor pain and functional outcomes.  Unique risks of this diagnosis and the treatment include persistent instability  and arthritis.   She is happy with the result she may return back to full duty on May 15th.  I will see her back as needed               I discussed worrisome and red flag signs and symptoms with the patient. The patient expressed understanding and agreed to alert me immediately or to go to the emergency room if they experience any of these.   Treatment plan was developed with input from the patient/family, and they expressed understanding and agreement with the plan. All questions were answered today.    There are no Patient Instructions on file for this visit.    JULIÁN Anders M.D.  [facility]  Orthopedic Hand and Wrist Surgeon    [facility]  Dr. Chad Anders   Google Review   5 exampless   [employer]     Disclaimer: This note was prepared using a voice recognition system and is likely to have sound alike errors within the text.

## 2025-04-24 ENCOUNTER — DOCUMENTATION ONLY (OUTPATIENT)
Dept: REHABILITATION | Facility: HOSPITAL | Age: 35
End: 2025-04-24
Payer: COMMERCIAL

## 2025-04-24 PROBLEM — R29.898 DECREASED GRIP STRENGTH OF RIGHT HAND: Status: RESOLVED | Noted: 2024-12-10 | Resolved: 2025-04-24

## 2025-04-24 PROBLEM — Z78.9 DECREASED ACTIVITIES OF DAILY LIVING (ADL): Status: RESOLVED | Noted: 2024-12-10 | Resolved: 2025-04-24

## 2025-04-24 PROBLEM — M25.541 PAIN IN THUMB JOINT WITH MOVEMENT OF RIGHT HAND: Status: RESOLVED | Noted: 2024-12-10 | Resolved: 2025-04-24

## 2025-04-24 PROBLEM — R29.898 DECREASED PINCH STRENGTH: Status: RESOLVED | Noted: 2024-12-10 | Resolved: 2025-04-24

## 2025-04-24 PROBLEM — M25.649 STIFFNESS OF THUMB JOINT: Status: RESOLVED | Noted: 2024-12-09 | Resolved: 2025-04-24

## 2025-04-24 NOTE — PROGRESS NOTES
[facility]   Occupational Therapy Discharge Note    Name: Frankie Plaza  [facility] Number: [id number]    Therapy Diagnosis:        Encounter Diagnoses   Name Primary?    Stiffness of thumb joint Yes    Pain in thumb joint with movement of right hand      Decreased activities of daily living (ADL)      Decreased  strength of right hand      Decreased pinch strength           Physician: Buck Anders MD     Physician Orders: OT eval and tx  Medical Diagnosis: Closed dislocation of metacarpophalangeal joint of right thumb, initial encounter [S63.114A]     Evaluation Date: 12/9/2025      Date of Last visit: 3/7/2025  Total Visits Received: 15    ASSESSMENT      See daily note    Discharge reason: Patient has reached the maximum rehab potential for the present time    Discharge FOTO Score: See daily note    Goals: See daily note    PLAN   This patient is discharged from occupational therapy.      AQUILINO Cramer, ELYT

## 2025-05-05 ENCOUNTER — PATIENT MESSAGE (OUTPATIENT)
Dept: ORTHOPEDICS | Facility: CLINIC | Age: 35
End: 2025-05-05
Payer: COMMERCIAL